# Patient Record
Sex: MALE | Race: WHITE | NOT HISPANIC OR LATINO | URBAN - METROPOLITAN AREA
[De-identification: names, ages, dates, MRNs, and addresses within clinical notes are randomized per-mention and may not be internally consistent; named-entity substitution may affect disease eponyms.]

---

## 2017-03-25 ENCOUNTER — EMERGENCY (EMERGENCY)
Facility: HOSPITAL | Age: 72
LOS: 1 days | Discharge: HOME | End: 2017-03-25
Attending: EMERGENCY MEDICINE
Payer: MEDICARE

## 2017-03-25 VITALS
RESPIRATION RATE: 20 BRPM | SYSTOLIC BLOOD PRESSURE: 161 MMHG | OXYGEN SATURATION: 98 % | HEART RATE: 78 BPM | HEIGHT: 69 IN | DIASTOLIC BLOOD PRESSURE: 79 MMHG | WEIGHT: 227.08 LBS | TEMPERATURE: 98 F

## 2017-03-25 DIAGNOSIS — M54.5 LOW BACK PAIN: ICD-10-CM

## 2017-03-25 LAB
ANION GAP SERPL CALC-SCNC: 13 MMOL/L — SIGNIFICANT CHANGE UP (ref 5–17)
APPEARANCE UR: CLEAR — SIGNIFICANT CHANGE UP
BASOPHILS # BLD AUTO: 0 K/UL — SIGNIFICANT CHANGE UP (ref 0–0.2)
BASOPHILS NFR BLD AUTO: 0.5 % — SIGNIFICANT CHANGE UP (ref 0–2)
BILIRUB UR-MCNC: NEGATIVE — SIGNIFICANT CHANGE UP
BUN SERPL-MCNC: 22 MG/DL — HIGH (ref 8–20)
CALCIUM SERPL-MCNC: 9.6 MG/DL — SIGNIFICANT CHANGE UP (ref 8.6–10.2)
CHLORIDE SERPL-SCNC: 100 MMOL/L — SIGNIFICANT CHANGE UP (ref 98–107)
CO2 SERPL-SCNC: 29 MMOL/L — SIGNIFICANT CHANGE UP (ref 22–29)
COLOR SPEC: YELLOW — SIGNIFICANT CHANGE UP
CREAT SERPL-MCNC: 0.67 MG/DL — SIGNIFICANT CHANGE UP (ref 0.5–1.3)
DIFF PNL FLD: NEGATIVE — SIGNIFICANT CHANGE UP
EOSINOPHIL # BLD AUTO: 0.6 K/UL — HIGH (ref 0–0.5)
EOSINOPHIL NFR BLD AUTO: 6.1 % — HIGH (ref 0–5)
GLUCOSE SERPL-MCNC: 100 MG/DL — SIGNIFICANT CHANGE UP (ref 70–115)
GLUCOSE UR QL: NEGATIVE MG/DL — SIGNIFICANT CHANGE UP
HCT VFR BLD CALC: 39.6 % — LOW (ref 42–52)
HGB BLD-MCNC: 12.7 G/DL — LOW (ref 14–18)
KETONES UR-MCNC: NEGATIVE — SIGNIFICANT CHANGE UP
LEUKOCYTE ESTERASE UR-ACNC: NEGATIVE — SIGNIFICANT CHANGE UP
LYMPHOCYTES # BLD AUTO: 1.8 K/UL — SIGNIFICANT CHANGE UP (ref 1–4.8)
LYMPHOCYTES # BLD AUTO: 17.9 % — LOW (ref 20–55)
MCHC RBC-ENTMCNC: 29.1 PG — SIGNIFICANT CHANGE UP (ref 27–31)
MCHC RBC-ENTMCNC: 32.1 G/DL — SIGNIFICANT CHANGE UP (ref 32–36)
MCV RBC AUTO: 90.6 FL — SIGNIFICANT CHANGE UP (ref 80–94)
MONOCYTES # BLD AUTO: 0.8 K/UL — SIGNIFICANT CHANGE UP (ref 0–0.8)
MONOCYTES NFR BLD AUTO: 8.5 % — SIGNIFICANT CHANGE UP (ref 3–10)
NEUTROPHILS # BLD AUTO: 6.5 K/UL — SIGNIFICANT CHANGE UP (ref 1.8–8)
NEUTROPHILS NFR BLD AUTO: 66.8 % — SIGNIFICANT CHANGE UP (ref 37–73)
NITRITE UR-MCNC: NEGATIVE — SIGNIFICANT CHANGE UP
PH UR: 6.5 — SIGNIFICANT CHANGE UP (ref 4.8–8)
PLATELET # BLD AUTO: 288 K/UL — SIGNIFICANT CHANGE UP (ref 150–400)
POTASSIUM SERPL-MCNC: 4.1 MMOL/L — SIGNIFICANT CHANGE UP (ref 3.5–5.3)
POTASSIUM SERPL-SCNC: 4.1 MMOL/L — SIGNIFICANT CHANGE UP (ref 3.5–5.3)
PROT UR-MCNC: NEGATIVE MG/DL — SIGNIFICANT CHANGE UP
RBC # BLD: 4.37 M/UL — LOW (ref 4.6–6.2)
RBC # FLD: 16.3 % — HIGH (ref 11–15.6)
SODIUM SERPL-SCNC: 142 MMOL/L — SIGNIFICANT CHANGE UP (ref 135–145)
SP GR SPEC: 1.01 — SIGNIFICANT CHANGE UP (ref 1.01–1.02)
UROBILINOGEN FLD QL: NEGATIVE MG/DL — SIGNIFICANT CHANGE UP
WBC # BLD: 9.8 K/UL — SIGNIFICANT CHANGE UP (ref 4.8–10.8)
WBC # FLD AUTO: 9.8 K/UL — SIGNIFICANT CHANGE UP (ref 4.8–10.8)

## 2017-03-25 PROCEDURE — 99284 EMERGENCY DEPT VISIT MOD MDM: CPT | Mod: 25

## 2017-03-25 PROCEDURE — 74176 CT ABD & PELVIS W/O CONTRAST: CPT

## 2017-03-25 PROCEDURE — 72131 CT LUMBAR SPINE W/O DYE: CPT | Mod: 26

## 2017-03-25 PROCEDURE — 81003 URINALYSIS AUTO W/O SCOPE: CPT

## 2017-03-25 PROCEDURE — 80048 BASIC METABOLIC PNL TOTAL CA: CPT

## 2017-03-25 PROCEDURE — 74176 CT ABD & PELVIS W/O CONTRAST: CPT | Mod: 26

## 2017-03-25 PROCEDURE — 99284 EMERGENCY DEPT VISIT MOD MDM: CPT

## 2017-03-25 PROCEDURE — 85027 COMPLETE CBC AUTOMATED: CPT

## 2017-03-25 RX ORDER — IBUPROFEN 200 MG
1 TABLET ORAL
Qty: 28 | Refills: 0 | OUTPATIENT
Start: 2017-03-25 | End: 2017-04-01

## 2017-03-25 RX ORDER — DIAZEPAM 5 MG
5 TABLET ORAL ONCE
Qty: 0 | Refills: 0 | Status: DISCONTINUED | OUTPATIENT
Start: 2017-03-25 | End: 2017-03-25

## 2017-03-25 RX ORDER — METHOCARBAMOL 500 MG/1
2 TABLET, FILM COATED ORAL
Qty: 30 | Refills: 0 | OUTPATIENT
Start: 2017-03-25 | End: 2017-03-30

## 2017-03-25 RX ADMIN — Medication 5 MILLIGRAM(S): at 17:53

## 2017-03-25 NOTE — ED PROVIDER NOTE - PROGRESS NOTE DETAILS
Pt reports moderate relief of presenting symptoms. Pts labs wnl, urine negative for infection and CT scan negative for acute pathology. D/w Dr. Nails-- pt stable for d/c with muscle relaxers and pcp f/u.

## 2017-03-25 NOTE — ED ADULT NURSE NOTE - OBJECTIVE STATEMENT
pt alert and awake x3, arrived to ED c/o left flank pain that started 2 hours ago, denies urinary problems, denies chest pain/sob, LS clear, resp even and unlabored bilat, will continue to monitor.

## 2017-03-25 NOTE — ED PROVIDER NOTE - OBJECTIVE STATEMENT
70 yo wm pmh hypercholesterolemia comes to ed with left lower back pain x 2 hours; pt noted being treated for sinus infection with augmentin; pt denies any recent trauma; pt noted he had a pelvic fracture at the age of 28;

## 2017-04-26 ENCOUNTER — APPOINTMENT (OUTPATIENT)
Dept: DERMATOLOGY | Facility: CLINIC | Age: 72
End: 2017-04-26

## 2017-09-15 ENCOUNTER — OUTPATIENT (OUTPATIENT)
Dept: OUTPATIENT SERVICES | Facility: HOSPITAL | Age: 72
LOS: 1 days | End: 2017-09-15
Payer: MEDICARE

## 2017-09-15 ENCOUNTER — TRANSCRIPTION ENCOUNTER (OUTPATIENT)
Age: 72
End: 2017-09-15

## 2017-09-15 DIAGNOSIS — Z12.11 ENCOUNTER FOR SCREENING FOR MALIGNANT NEOPLASM OF COLON: ICD-10-CM

## 2017-09-15 PROCEDURE — G0105: CPT

## 2017-11-24 ENCOUNTER — EMERGENCY (EMERGENCY)
Facility: HOSPITAL | Age: 72
LOS: 1 days | Discharge: HOME | End: 2017-11-24
Attending: EMERGENCY MEDICINE | Admitting: EMERGENCY MEDICINE
Payer: MEDICARE

## 2017-11-24 VITALS
DIASTOLIC BLOOD PRESSURE: 78 MMHG | TEMPERATURE: 98 F | HEIGHT: 70 IN | OXYGEN SATURATION: 97 % | HEART RATE: 74 BPM | WEIGHT: 229.94 LBS | SYSTOLIC BLOOD PRESSURE: 136 MMHG | RESPIRATION RATE: 20 BRPM

## 2017-11-24 PROCEDURE — 99284 EMERGENCY DEPT VISIT MOD MDM: CPT

## 2017-11-24 PROCEDURE — 74176 CT ABD & PELVIS W/O CONTRAST: CPT | Mod: 26

## 2017-11-24 NOTE — ED PROVIDER NOTE - NS ED ROS FT
no weight change, no fever or chills  no rash, no bruises  no visual changes no discharge  no cough cold or congestion,   no sob, no chest pain  no orthopnea, no pnd  +left flank pain, no abd pain, no n/v/d  +hematuria, no change in urinary habits  no headache, no paresthesia  no previous psych evaluation

## 2017-11-24 NOTE — ED PROVIDER NOTE - PHYSICAL EXAMINATION
Constitutinal :Appears comfortably, talking in full sentences  Head :NC AT , no swelling  Eyes :eomi, no swelling  Mouth :mm moist, poor dentition. Facial asymmetry  Neck : supple, trachea in midline  Chest :Jorden air entry, symm chest expansion, no distress  Heart :S1 S2 distant  Abdomen :abd soft, non tender  : no groin erythema. No ulcers. No erythema to meatus.   Musc/Skel :ext no swelling, no deformity, no spine tenderness, distal pilses present  Neuro  :AAO 3 no focal deficits

## 2017-11-24 NOTE — ED PROVIDER NOTE - OBJECTIVE STATEMENT
73 y/o M pt presents to ED c/o hematuria x1-2 weeks. Pt reports that a few weeks ago, he noticed a small amount of blood in his urine that resolved. Earlier this week he experienced more blood in his urine, which has increased today. He notes left flank pain when he lays on that side. Pt describes hematuria as "a lot of blood comes out at first, and then the urine turns pink." Pt was evaluated in ED 4 months ago for left flank pain and was told he has a small kidney stone. He denies blood thinners. Pt denies recent illness, weight change, CP, SOB, nausea, vomiting, abdominal pain, and headache.

## 2017-11-25 VITALS
DIASTOLIC BLOOD PRESSURE: 96 MMHG | RESPIRATION RATE: 20 BRPM | TEMPERATURE: 97 F | HEART RATE: 81 BPM | OXYGEN SATURATION: 97 % | SYSTOLIC BLOOD PRESSURE: 156 MMHG

## 2017-11-25 LAB
ALBUMIN SERPL ELPH-MCNC: 3.8 G/DL — SIGNIFICANT CHANGE UP (ref 3.3–5.2)
ALP SERPL-CCNC: 122 U/L — HIGH (ref 40–120)
ALT FLD-CCNC: 17 U/L — SIGNIFICANT CHANGE UP
ANION GAP SERPL CALC-SCNC: 12 MMOL/L — SIGNIFICANT CHANGE UP (ref 5–17)
APPEARANCE UR: CLEAR — SIGNIFICANT CHANGE UP
APTT BLD: 32.5 SEC — SIGNIFICANT CHANGE UP (ref 27.5–37.4)
AST SERPL-CCNC: 26 U/L — SIGNIFICANT CHANGE UP
BASOPHILS # BLD AUTO: 0 K/UL — SIGNIFICANT CHANGE UP (ref 0–0.2)
BASOPHILS NFR BLD AUTO: 0.3 % — SIGNIFICANT CHANGE UP (ref 0–2)
BILIRUB SERPL-MCNC: 0.2 MG/DL — LOW (ref 0.4–2)
BILIRUB UR-MCNC: NEGATIVE — SIGNIFICANT CHANGE UP
BUN SERPL-MCNC: 27 MG/DL — HIGH (ref 8–20)
CALCIUM SERPL-MCNC: 9.2 MG/DL — SIGNIFICANT CHANGE UP (ref 8.6–10.2)
CHLORIDE SERPL-SCNC: 103 MMOL/L — SIGNIFICANT CHANGE UP (ref 98–107)
CO2 SERPL-SCNC: 28 MMOL/L — SIGNIFICANT CHANGE UP (ref 22–29)
COLOR SPEC: YELLOW — SIGNIFICANT CHANGE UP
CREAT SERPL-MCNC: 0.69 MG/DL — SIGNIFICANT CHANGE UP (ref 0.5–1.3)
DIFF PNL FLD: ABNORMAL
EOSINOPHIL # BLD AUTO: 0.5 K/UL — SIGNIFICANT CHANGE UP (ref 0–0.5)
EOSINOPHIL NFR BLD AUTO: 7.2 % — HIGH (ref 0–5)
EPI CELLS # UR: SIGNIFICANT CHANGE UP
GLUCOSE SERPL-MCNC: 127 MG/DL — HIGH (ref 70–115)
GLUCOSE UR QL: NEGATIVE MG/DL — SIGNIFICANT CHANGE UP
HCT VFR BLD CALC: 37.4 % — LOW (ref 42–52)
HGB BLD-MCNC: 11.9 G/DL — LOW (ref 14–18)
INR BLD: 1.01 RATIO — SIGNIFICANT CHANGE UP (ref 0.88–1.16)
KETONES UR-MCNC: NEGATIVE — SIGNIFICANT CHANGE UP
LACTATE BLDV-MCNC: 0.8 MMOL/L — SIGNIFICANT CHANGE UP (ref 0.5–2)
LEUKOCYTE ESTERASE UR-ACNC: ABNORMAL
LYMPHOCYTES # BLD AUTO: 2 K/UL — SIGNIFICANT CHANGE UP (ref 1–4.8)
LYMPHOCYTES # BLD AUTO: 31.9 % — SIGNIFICANT CHANGE UP (ref 20–55)
MCHC RBC-ENTMCNC: 29.2 PG — SIGNIFICANT CHANGE UP (ref 27–31)
MCHC RBC-ENTMCNC: 31.8 G/DL — LOW (ref 32–36)
MCV RBC AUTO: 91.9 FL — SIGNIFICANT CHANGE UP (ref 80–94)
MONOCYTES # BLD AUTO: 0.8 K/UL — SIGNIFICANT CHANGE UP (ref 0–0.8)
MONOCYTES NFR BLD AUTO: 11.7 % — HIGH (ref 3–10)
NEUTROPHILS # BLD AUTO: 3.1 K/UL — SIGNIFICANT CHANGE UP (ref 1.8–8)
NEUTROPHILS NFR BLD AUTO: 48.7 % — SIGNIFICANT CHANGE UP (ref 37–73)
NITRITE UR-MCNC: NEGATIVE — SIGNIFICANT CHANGE UP
PH UR: 6 — SIGNIFICANT CHANGE UP (ref 5–8)
PLATELET # BLD AUTO: 303 K/UL — SIGNIFICANT CHANGE UP (ref 150–400)
POTASSIUM SERPL-MCNC: 4.3 MMOL/L — SIGNIFICANT CHANGE UP (ref 3.5–5.3)
POTASSIUM SERPL-SCNC: 4.3 MMOL/L — SIGNIFICANT CHANGE UP (ref 3.5–5.3)
PROT SERPL-MCNC: 6.9 G/DL — SIGNIFICANT CHANGE UP (ref 6.6–8.7)
PROT UR-MCNC: NEGATIVE MG/DL — SIGNIFICANT CHANGE UP
PROTHROM AB SERPL-ACNC: 11.1 SEC — SIGNIFICANT CHANGE UP (ref 9.8–12.7)
RBC # BLD: 4.07 M/UL — LOW (ref 4.6–6.2)
RBC # FLD: 16 % — HIGH (ref 11–15.6)
RBC CASTS # UR COMP ASSIST: SIGNIFICANT CHANGE UP /HPF (ref 0–4)
SODIUM SERPL-SCNC: 143 MMOL/L — SIGNIFICANT CHANGE UP (ref 135–145)
SP GR SPEC: 1.02 — SIGNIFICANT CHANGE UP (ref 1.01–1.02)
UROBILINOGEN FLD QL: NEGATIVE MG/DL — SIGNIFICANT CHANGE UP
WBC # BLD: 6.4 K/UL — SIGNIFICANT CHANGE UP (ref 4.8–10.8)
WBC # FLD AUTO: 6.4 K/UL — SIGNIFICANT CHANGE UP (ref 4.8–10.8)
WBC UR QL: SIGNIFICANT CHANGE UP

## 2017-11-25 PROCEDURE — 36415 COLL VENOUS BLD VENIPUNCTURE: CPT

## 2017-11-25 PROCEDURE — 99284 EMERGENCY DEPT VISIT MOD MDM: CPT | Mod: 25

## 2017-11-25 PROCEDURE — 74176 CT ABD & PELVIS W/O CONTRAST: CPT

## 2017-11-25 PROCEDURE — 85027 COMPLETE CBC AUTOMATED: CPT

## 2017-11-25 PROCEDURE — 80053 COMPREHEN METABOLIC PANEL: CPT

## 2017-11-25 PROCEDURE — 85730 THROMBOPLASTIN TIME PARTIAL: CPT

## 2017-11-25 PROCEDURE — 81001 URINALYSIS AUTO W/SCOPE: CPT

## 2017-11-25 PROCEDURE — 85610 PROTHROMBIN TIME: CPT

## 2017-11-25 PROCEDURE — 87086 URINE CULTURE/COLONY COUNT: CPT

## 2017-11-25 PROCEDURE — 83605 ASSAY OF LACTIC ACID: CPT

## 2017-11-25 RX ORDER — SODIUM CHLORIDE 9 MG/ML
999 INJECTION INTRAMUSCULAR; INTRAVENOUS; SUBCUTANEOUS ONCE
Qty: 0 | Refills: 0 | Status: COMPLETED | OUTPATIENT
Start: 2017-11-25 | End: 2017-11-25

## 2017-11-25 RX ADMIN — SODIUM CHLORIDE 999 MILLILITER(S): 9 INJECTION INTRAMUSCULAR; INTRAVENOUS; SUBCUTANEOUS at 01:58

## 2017-11-25 NOTE — ED ADULT NURSE NOTE - OBJECTIVE STATEMENT
Pt A&Ox4 c/o hematuria coming and go x 1 week at this time. Pt resting comfortably, VSS, no signs of distress at this time, safety maintained, call bell in reach.

## 2017-11-25 NOTE — ED ADULT NURSE REASSESSMENT NOTE - NS ED NURSE REASSESS COMMENT FT1
Pt A&Ox4 offers no complaint at this time. Pt resting comfortably, VSS, no signs of distress at this time, monitoring BP giving fluids, safety maintained, call bell in reach.

## 2017-11-25 NOTE — ED POST DISCHARGE NOTE - RESULT SUMMARY
NP Rotjan: + strep uti - spoke with pt, has apt with Dr. Mai (urology) tomorrow- requested to fax results to his office

## 2017-11-27 LAB
CULTURE RESULTS: SIGNIFICANT CHANGE UP
SPECIMEN SOURCE: SIGNIFICANT CHANGE UP

## 2017-11-29 NOTE — PROVIDER CONTACT NOTE (OTHER) - ASSESSMENT
PT ARRIVED TO ER, STATES HE RECEIVED A CALL TO EITHER COME  MEDICATION OR  A PRESCRIPTION.  PT WAS SEEN BY UROLOGY DR MARKHAM TODAY BUT NO MEDS WERE GIVEN AS PT STATED HE WAS GOING TO THE ER TO GET HIS MEDS.  NO NOTES IS CHART, NM NOT AWARE OF ANYTHING AS WELL.  CM CALLED PT'S PHARMACY, NO SCRIPTS CALLED IN.  CM REACHED OUT TO UROLOGY DR MARKHAM TO INFORM HIM OF SAME.  LEFT MESSAGE WITH DR MARKHAM'S NURSE TO ADDRESS THE NEED FOR ANY UTI MEDS NEEDED.

## 2017-12-09 ENCOUNTER — EMERGENCY (EMERGENCY)
Facility: HOSPITAL | Age: 72
LOS: 1 days | Discharge: HOME | End: 2017-12-09
Attending: EMERGENCY MEDICINE | Admitting: EMERGENCY MEDICINE
Payer: MEDICARE

## 2017-12-09 VITALS
WEIGHT: 229.94 LBS | DIASTOLIC BLOOD PRESSURE: 78 MMHG | HEART RATE: 92 BPM | RESPIRATION RATE: 18 BRPM | HEIGHT: 71 IN | SYSTOLIC BLOOD PRESSURE: 136 MMHG | TEMPERATURE: 97 F | OXYGEN SATURATION: 98 %

## 2017-12-09 LAB
APPEARANCE UR: CLEAR — SIGNIFICANT CHANGE UP
BACTERIA # UR AUTO: ABNORMAL
BILIRUB UR-MCNC: NEGATIVE — SIGNIFICANT CHANGE UP
COLOR SPEC: YELLOW — SIGNIFICANT CHANGE UP
DIFF PNL FLD: NEGATIVE — SIGNIFICANT CHANGE UP
EPI CELLS # UR: NEGATIVE — SIGNIFICANT CHANGE UP
GLUCOSE UR QL: NEGATIVE MG/DL — SIGNIFICANT CHANGE UP
KETONES UR-MCNC: ABNORMAL
LEUKOCYTE ESTERASE UR-ACNC: ABNORMAL
NITRITE UR-MCNC: NEGATIVE — SIGNIFICANT CHANGE UP
PH UR: 5 — SIGNIFICANT CHANGE UP (ref 5–8)
PROT UR-MCNC: 30 MG/DL
RBC CASTS # UR COMP ASSIST: SIGNIFICANT CHANGE UP /HPF (ref 0–4)
SP GR SPEC: 1.02 — SIGNIFICANT CHANGE UP (ref 1.01–1.02)
UROBILINOGEN FLD QL: NEGATIVE MG/DL — SIGNIFICANT CHANGE UP
WBC UR QL: SIGNIFICANT CHANGE UP

## 2017-12-09 PROCEDURE — 99283 EMERGENCY DEPT VISIT LOW MDM: CPT | Mod: 25

## 2017-12-09 PROCEDURE — 73070 X-RAY EXAM OF ELBOW: CPT

## 2017-12-09 PROCEDURE — 99283 EMERGENCY DEPT VISIT LOW MDM: CPT

## 2017-12-09 PROCEDURE — 81001 URINALYSIS AUTO W/SCOPE: CPT

## 2017-12-09 PROCEDURE — 73070 X-RAY EXAM OF ELBOW: CPT | Mod: 26,RT

## 2017-12-09 RX ORDER — ROSUVASTATIN CALCIUM 5 MG/1
0 TABLET ORAL
Qty: 0 | Refills: 0 | COMMUNITY

## 2017-12-09 NOTE — ED PROVIDER NOTE - OBJECTIVE STATEMENT
73 y/o M c/o right elbow pain radiating to shoulder x 3 days.  Denies injury.  Patient states that he has history of arthritis.  he states that he was doing mechanical work on his car prior to the onset of pain.  Patient states that received a call back saying that he had blood in his urine.  Denies any dysuria, fever, or back pain.

## 2017-12-09 NOTE — ED ADULT NURSE NOTE - OBJECTIVE STATEMENT
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 as per pt, mild swelling noted. no loss of sensation noted, pt states he was seen in ED one week ago for hematuria, pt dx with kidney infection. pt states he never received the abx that was prescribed, pt states the RX was not sent over to the pharmacy. afebrile. a&ox3

## 2017-12-09 NOTE — ED PROVIDER NOTE - ATTENDING CONTRIBUTION TO CARE
I, Isaura Hills, independently saw and evaluated the patient. SURESH Bonilla made the initial evaluation and discussed history, physical and plan with me and I agree. I examined the patient and shared XR results with patient. I offered emotional support as patient tells me that his girlfriend just recently passed away from Parkinson's disease. I discussed indications to return to the ED and the importance of proper follow up and he verbalizes understanding and agrees with plan.

## 2018-03-30 ENCOUNTER — APPOINTMENT (OUTPATIENT)
Dept: ORTHOPEDIC SURGERY | Facility: CLINIC | Age: 73
End: 2018-03-30
Payer: MEDICARE

## 2018-03-30 VITALS
SYSTOLIC BLOOD PRESSURE: 157 MMHG | WEIGHT: 230 LBS | HEART RATE: 71 BPM | HEIGHT: 70 IN | BODY MASS INDEX: 32.93 KG/M2 | DIASTOLIC BLOOD PRESSURE: 89 MMHG

## 2018-03-30 DIAGNOSIS — M17.10 UNILATERAL PRIMARY OSTEOARTHRITIS, UNSPECIFIED KNEE: ICD-10-CM

## 2018-03-30 DIAGNOSIS — Z85.828 PERSONAL HISTORY OF OTHER MALIGNANT NEOPLASM OF SKIN: ICD-10-CM

## 2018-03-30 DIAGNOSIS — Z80.9 FAMILY HISTORY OF MALIGNANT NEOPLASM, UNSPECIFIED: ICD-10-CM

## 2018-03-30 DIAGNOSIS — Z78.9 OTHER SPECIFIED HEALTH STATUS: ICD-10-CM

## 2018-03-30 DIAGNOSIS — Z86.39 PERSONAL HISTORY OF OTHER ENDOCRINE, NUTRITIONAL AND METABOLIC DISEASE: ICD-10-CM

## 2018-03-30 PROCEDURE — 73562 X-RAY EXAM OF KNEE 3: CPT | Mod: RT

## 2018-03-30 PROCEDURE — 99204 OFFICE O/P NEW MOD 45 MIN: CPT

## 2018-03-30 RX ORDER — CIPROFLOXACIN HYDROCHLORIDE 500 MG/1
500 TABLET, FILM COATED ORAL
Qty: 6 | Refills: 0 | Status: ACTIVE | COMMUNITY
Start: 2017-12-27

## 2018-03-30 RX ORDER — FINASTERIDE 5 MG/1
5 TABLET, FILM COATED ORAL
Qty: 90 | Refills: 0 | Status: ACTIVE | COMMUNITY
Start: 2017-12-27

## 2018-03-30 RX ORDER — SULFAMETHOXAZOLE AND TRIMETHOPRIM 800; 160 MG/1; MG/1
800-160 TABLET ORAL
Qty: 14 | Refills: 0 | Status: ACTIVE | COMMUNITY
Start: 2018-01-08

## 2018-03-30 RX ORDER — METHYLPREDNISOLONE 4 MG/1
4 TABLET ORAL
Qty: 21 | Refills: 0 | Status: ACTIVE | COMMUNITY
Start: 2017-12-09

## 2018-03-30 RX ORDER — DICLOFENAC SODIUM 50 MG/1
50 TABLET, DELAYED RELEASE ORAL
Qty: 30 | Refills: 0 | Status: ACTIVE | COMMUNITY
Start: 2017-10-20

## 2018-03-30 RX ORDER — ATORVASTATIN CALCIUM 20 MG/1
20 TABLET, FILM COATED ORAL
Qty: 30 | Refills: 0 | Status: ACTIVE | COMMUNITY
Start: 2017-10-13

## 2018-03-30 RX ORDER — TAMSULOSIN HYDROCHLORIDE 0.4 MG/1
0.4 CAPSULE ORAL
Qty: 90 | Refills: 0 | Status: ACTIVE | COMMUNITY
Start: 2017-10-12

## 2018-03-30 RX ORDER — LISINOPRIL AND HYDROCHLOROTHIAZIDE TABLETS 20; 12.5 MG/1; MG/1
20-12.5 TABLET ORAL
Qty: 90 | Refills: 0 | Status: ACTIVE | COMMUNITY
Start: 2017-11-07

## 2018-04-25 ENCOUNTER — APPOINTMENT (OUTPATIENT)
Dept: DERMATOLOGY | Facility: CLINIC | Age: 73
End: 2018-04-25

## 2018-05-03 ENCOUNTER — APPOINTMENT (OUTPATIENT)
Dept: DERMATOLOGY | Facility: CLINIC | Age: 73
End: 2018-05-03
Payer: MEDICARE

## 2018-05-03 PROCEDURE — 17004 DESTROY PREMAL LESIONS 15/>: CPT

## 2018-05-03 PROCEDURE — 99214 OFFICE O/P EST MOD 30 MIN: CPT | Mod: 25

## 2019-07-19 ENCOUNTER — OUTPATIENT (OUTPATIENT)
Dept: OUTPATIENT SERVICES | Facility: HOSPITAL | Age: 74
LOS: 1 days | End: 2019-07-19
Payer: MEDICARE

## 2019-07-19 PROCEDURE — 93010 ELECTROCARDIOGRAM REPORT: CPT

## 2019-08-08 NOTE — ED ADULT NURSE NOTE - PSYCHOSOCIAL WDL
Alert and oriented x 3, normal mood and affect, no apparent risk to self or others. Need for Mobility Assisted Device

## 2019-08-09 ENCOUNTER — INPATIENT (INPATIENT)
Facility: HOSPITAL | Age: 74
LOS: 1 days | Discharge: NURSING, REHAB OR LONG-TERM CARE | End: 2019-08-11
Payer: MEDICARE

## 2019-08-09 PROCEDURE — 73560 X-RAY EXAM OF KNEE 1 OR 2: CPT | Mod: 26,RT

## 2019-08-10 ENCOUNTER — OUTPATIENT (OUTPATIENT)
Dept: OUTPATIENT SERVICES | Facility: HOSPITAL | Age: 74
LOS: 1 days | End: 2019-08-10

## 2019-08-11 ENCOUNTER — OUTPATIENT (OUTPATIENT)
Dept: OUTPATIENT SERVICES | Facility: HOSPITAL | Age: 74
LOS: 1 days | End: 2019-08-11

## 2019-08-11 ENCOUNTER — INPATIENT (INPATIENT)
Facility: HOSPITAL | Age: 74
LOS: 12 days | Discharge: HOME | End: 2019-08-24
Attending: INTERNAL MEDICINE | Admitting: INTERNAL MEDICINE
Payer: MEDICARE

## 2019-08-12 ENCOUNTER — OUTPATIENT (OUTPATIENT)
Dept: OUTPATIENT SERVICES | Facility: HOSPITAL | Age: 74
LOS: 1 days | End: 2019-08-12

## 2019-08-13 ENCOUNTER — OUTPATIENT (OUTPATIENT)
Dept: OUTPATIENT SERVICES | Facility: HOSPITAL | Age: 74
LOS: 1 days | End: 2019-08-13

## 2019-08-14 ENCOUNTER — OUTPATIENT (OUTPATIENT)
Dept: OUTPATIENT SERVICES | Facility: HOSPITAL | Age: 74
LOS: 1 days | End: 2019-08-14

## 2019-08-14 PROCEDURE — 93970 EXTREMITY STUDY: CPT | Mod: 26

## 2019-08-15 ENCOUNTER — OUTPATIENT (OUTPATIENT)
Dept: OUTPATIENT SERVICES | Facility: HOSPITAL | Age: 74
LOS: 1 days | End: 2019-08-15

## 2019-08-19 ENCOUNTER — OUTPATIENT (OUTPATIENT)
Dept: OUTPATIENT SERVICES | Facility: HOSPITAL | Age: 74
LOS: 1 days | End: 2019-08-19

## 2019-08-23 ENCOUNTER — OUTPATIENT (OUTPATIENT)
Dept: OUTPATIENT SERVICES | Facility: HOSPITAL | Age: 74
LOS: 1 days | End: 2019-08-23

## 2019-10-11 NOTE — ED PROVIDER NOTE - ENMT NEGATIVE STATEMENT, MLM
To get better and follow your care plan as instructed.
Ears: no ear pain and no hearing problems.Nose: no nasal congestion and no nasal drainage.Mouth/Throat: no dysphagia, no hoarseness and no throat pain.Neck: no lumps, no pain, no stiffness and no swollen glands.

## 2020-01-02 ENCOUNTER — OUTPATIENT (OUTPATIENT)
Dept: OUTPATIENT SERVICES | Facility: HOSPITAL | Age: 75
LOS: 1 days | End: 2020-01-02

## 2020-01-17 ENCOUNTER — INPATIENT (INPATIENT)
Facility: HOSPITAL | Age: 75
LOS: 2 days | Discharge: NURSING, REHAB OR LONG-TERM CARE | End: 2020-01-20
Payer: MEDICARE

## 2020-01-17 ENCOUNTER — OUTPATIENT (OUTPATIENT)
Dept: OUTPATIENT SERVICES | Facility: HOSPITAL | Age: 75
LOS: 1 days | End: 2020-01-17

## 2020-01-17 PROCEDURE — 73560 X-RAY EXAM OF KNEE 1 OR 2: CPT | Mod: 26,LT

## 2020-01-18 ENCOUNTER — OUTPATIENT (OUTPATIENT)
Dept: OUTPATIENT SERVICES | Facility: HOSPITAL | Age: 75
LOS: 1 days | End: 2020-01-18

## 2020-01-19 ENCOUNTER — OUTPATIENT (OUTPATIENT)
Dept: OUTPATIENT SERVICES | Facility: HOSPITAL | Age: 75
LOS: 1 days | End: 2020-01-19

## 2020-01-20 ENCOUNTER — OUTPATIENT (OUTPATIENT)
Dept: OUTPATIENT SERVICES | Facility: HOSPITAL | Age: 75
LOS: 1 days | End: 2020-01-20

## 2020-01-20 ENCOUNTER — INPATIENT (INPATIENT)
Facility: HOSPITAL | Age: 75
LOS: 9 days | Discharge: HOME | End: 2020-01-30
Attending: INTERNAL MEDICINE | Admitting: INTERNAL MEDICINE

## 2020-01-21 ENCOUNTER — OUTPATIENT (OUTPATIENT)
Dept: OUTPATIENT SERVICES | Facility: HOSPITAL | Age: 75
LOS: 1 days | End: 2020-01-21

## 2020-01-24 ENCOUNTER — OUTPATIENT (OUTPATIENT)
Dept: OUTPATIENT SERVICES | Facility: HOSPITAL | Age: 75
LOS: 1 days | End: 2020-01-24

## 2020-01-30 ENCOUNTER — OUTPATIENT (OUTPATIENT)
Dept: OUTPATIENT SERVICES | Facility: HOSPITAL | Age: 75
LOS: 1 days | End: 2020-01-30

## 2020-03-13 PROBLEM — E78.5 HYPERLIPIDEMIA, UNSPECIFIED: Chronic | Status: ACTIVE | Noted: 2017-12-09

## 2020-03-14 ENCOUNTER — APPOINTMENT (OUTPATIENT)
Dept: DERMATOLOGY | Facility: CLINIC | Age: 75
End: 2020-03-14
Payer: MEDICARE

## 2020-03-14 PROCEDURE — 17000 DESTRUCT PREMALG LESION: CPT

## 2020-03-14 PROCEDURE — 99213 OFFICE O/P EST LOW 20 MIN: CPT | Mod: 25

## 2020-03-14 PROCEDURE — 17003 DESTRUCT PREMALG LES 2-14: CPT

## 2020-05-04 NOTE — ED POST DISCHARGE NOTE - CERTIFIED LETTER SENT
No
Painful - The patient does not respond to voice, but does respond to a painful stimulus, such as a squeeze to the hand or sternal rub. A noxious stimulous is needed to elicit a response.

## 2021-03-15 ENCOUNTER — APPOINTMENT (OUTPATIENT)
Dept: DERMATOLOGY | Facility: CLINIC | Age: 76
End: 2021-03-15
Payer: MEDICARE

## 2021-03-15 PROCEDURE — 17000 DESTRUCT PREMALG LESION: CPT

## 2021-03-15 PROCEDURE — 99213 OFFICE O/P EST LOW 20 MIN: CPT | Mod: 25

## 2021-03-15 PROCEDURE — 17003 DESTRUCT PREMALG LES 2-14: CPT

## 2021-10-11 NOTE — ED ADULT NURSE NOTE - ALCOHOL PRE SCREEN (AUDIT - C)
From: Sandy Gonsalez  To: Juanis Pizarro  Sent: 10/10/2021 3:50 PM CDT  Subject: Migraine Meds    Since you gave me the medicine for my migraines. I have 4 left. I've also setup those appointments with the doctors we discussed from my last appointment. The sleep medication has helped and I'm not groggy when I wake up.   
Statement Selected

## 2022-03-16 ENCOUNTER — RESULT REVIEW (OUTPATIENT)
Age: 77
End: 2022-03-16

## 2022-03-16 ENCOUNTER — APPOINTMENT (OUTPATIENT)
Dept: DERMATOLOGY | Facility: CLINIC | Age: 77
End: 2022-03-16
Payer: MEDICARE

## 2022-03-16 PROCEDURE — 17000 DESTRUCT PREMALG LESION: CPT | Mod: 59

## 2022-03-16 PROCEDURE — 99214 OFFICE O/P EST MOD 30 MIN: CPT | Mod: 25

## 2022-03-16 PROCEDURE — 17003 DESTRUCT PREMALG LES 2-14: CPT

## 2022-03-16 PROCEDURE — 17282 DSTR MAL LS F/E/E/N/L/M1.1-2: CPT

## 2022-09-21 ENCOUNTER — APPOINTMENT (OUTPATIENT)
Dept: DERMATOLOGY | Facility: CLINIC | Age: 77
End: 2022-09-21

## 2022-10-17 NOTE — ED PROVIDER NOTE - DURATION
[FreeTextEntry1] : Patient is a 28-year-old man with history of type 1 diabetes, history establish endocrinology visit\par \par 1.  Type 1 diabetes, relatively well controlled\par \par Might need to change carb ratio to 1 units per 9 to 10 g when he is on the GLP-1 receptor agonist.\par Discussed risks of the thyroid C-cell tumor, pancreatitis, hypoglycemia, hypersensitivity reactions, acute kidney injury, severe gastrointestinal complications, discussed that the most common adverse infections included nausea, diarrhea, vomiting, and abdominal pain.  We discussed that gastric emptying slow by GLP-1 receptor agonist.  \par Diabetic retinoapthy complications have been reported in a cardiovascular outcomes trial. Should have semi-annual eye exam and close follow up with his/her ophthalmologist.\par Zofran sent for intermittent nausea in the setting of hyperglycemia.\par Up to date, he goes yearly, last seen in July 2022. [facility]\par No podiatrist, he checks his feet annually. \par \par 2.  Thyroid screening\par Normal TFT in June 2022.  \par \par 3.  Celiac screening\par Normal in 2022.\par Repeat next year.\par \par FU CDE in 6 months. \par FU with me in 1 year. \par 
week(s)

## 2023-01-23 ENCOUNTER — OFFICE (OUTPATIENT)
Dept: URBAN - METROPOLITAN AREA CLINIC 116 | Facility: CLINIC | Age: 78
Setting detail: OPHTHALMOLOGY
End: 2023-01-23
Payer: MEDICARE

## 2023-01-23 DIAGNOSIS — H25.13: ICD-10-CM

## 2023-01-23 DIAGNOSIS — H40.003: ICD-10-CM

## 2023-01-23 DIAGNOSIS — H01.001: ICD-10-CM

## 2023-01-23 PROCEDURE — 92250 FUNDUS PHOTOGRAPHY W/I&R: CPT | Performed by: OPTOMETRIST

## 2023-01-23 PROCEDURE — 92004 COMPRE OPH EXAM NEW PT 1/>: CPT | Performed by: OPTOMETRIST

## 2023-01-23 ASSESSMENT — REFRACTION_MANIFEST
OS_ADD: +2.50
OU_VA: 20/25-1
OS_SPHERE: PLANO
OS_CYLINDER: -0.50
OD_ADD: +2.50
OD_CYLINDER: -1.50
OS_AXIS: 105
OD_VA1: 20/25-2
OS_VA1: 20/30-2
OD_SPHERE: -0.75
OD_AXIS: 095

## 2023-01-23 ASSESSMENT — REFRACTION_CURRENTRX
OD_SPHERE: +2.00
OD_OVR_VA: 20/
OS_OVR_VA: 20/
OS_SPHERE: +2.00

## 2023-01-23 ASSESSMENT — SPHEQUIV_DERIVED
OD_SPHEQUIV: -1.625
OD_SPHEQUIV: -1.5
OS_SPHEQUIV: 0.125

## 2023-01-23 ASSESSMENT — REFRACTION_AUTOREFRACTION
OD_AXIS: 095
OS_AXIS: 105
OS_SPHERE: +0.50
OD_SPHERE: -0.75
OS_CYLINDER: -0.75
OD_CYLINDER: -1.75

## 2023-01-23 ASSESSMENT — VISUAL ACUITY
OD_BCVA: 20/30-2
OS_BCVA: 20/50-

## 2023-01-23 ASSESSMENT — CONFRONTATIONAL VISUAL FIELD TEST (CVF)
OS_FINDINGS: FULL
OD_FINDINGS: FULL

## 2023-01-23 ASSESSMENT — KERATOMETRY
OS_K1POWER_DIOPTERS: 42.25
OD_K2POWER_DIOPTERS: 42.75
OD_AXISANGLE_DEGREES: 095
OS_AXISANGLE_DEGREES: 095
OS_K2POWER_DIOPTERS: 42.75
OD_K1POWER_DIOPTERS: 42.50

## 2023-01-23 ASSESSMENT — AXIALLENGTH_DERIVED
OD_AL: 24.5867
OD_AL: 24.5339
OS_AL: 23.9149

## 2023-01-23 ASSESSMENT — LID EXAM ASSESSMENTS: OD_ANGULAR_BLEPHARITIS: RUL 1+

## 2023-01-30 ENCOUNTER — OFFICE (OUTPATIENT)
Dept: URBAN - METROPOLITAN AREA CLINIC 116 | Facility: CLINIC | Age: 78
Setting detail: OPHTHALMOLOGY
End: 2023-01-30
Payer: MEDICARE

## 2023-01-30 DIAGNOSIS — H40.1131: ICD-10-CM

## 2023-01-30 PROCEDURE — 99212 OFFICE O/P EST SF 10 MIN: CPT | Performed by: OPTOMETRIST

## 2023-01-30 PROCEDURE — 76514 ECHO EXAM OF EYE THICKNESS: CPT | Performed by: OPTOMETRIST

## 2023-01-30 PROCEDURE — 92133 CPTRZD OPH DX IMG PST SGM ON: CPT | Performed by: OPTOMETRIST

## 2023-01-30 ASSESSMENT — REFRACTION_AUTOREFRACTION
OS_SPHERE: +0.50
OD_CYLINDER: -1.50
OD_AXIS: 091
OS_CYLINDER: -0.50
OD_SPHERE: -1.00
OS_AXIS: 111

## 2023-01-30 ASSESSMENT — KERATOMETRY
OD_K1POWER_DIOPTERS: 42.50
OS_AXISANGLE_DEGREES: 093
OD_K2POWER_DIOPTERS: 42.75
OD_AXISANGLE_DEGREES: 079
OS_K1POWER_DIOPTERS: 42.50
OS_K2POWER_DIOPTERS: 43.00

## 2023-01-30 ASSESSMENT — AXIALLENGTH_DERIVED
OD_AL: 24.5339
OS_AL: 23.7713
OD_AL: 24.6397

## 2023-01-30 ASSESSMENT — VISUAL ACUITY
OD_BCVA: 20/30-2
OS_BCVA: 20/50-

## 2023-01-30 ASSESSMENT — REFRACTION_MANIFEST
OD_ADD: +2.50
OD_SPHERE: -0.75
OS_VA1: 20/30-2
OS_CYLINDER: -0.50
OD_AXIS: 095
OS_SPHERE: PLANO
OD_CYLINDER: -1.50
OS_ADD: +2.50
OD_VA1: 20/25-2
OU_VA: 20/25-1
OS_AXIS: 105

## 2023-01-30 ASSESSMENT — REFRACTION_CURRENTRX
OD_SPHERE: +2.00
OD_OVR_VA: 20/
OS_SPHERE: +2.00
OS_OVR_VA: 20/

## 2023-01-30 ASSESSMENT — LID EXAM ASSESSMENTS: OD_ANGULAR_BLEPHARITIS: RUL 1+

## 2023-01-30 ASSESSMENT — SPHEQUIV_DERIVED
OD_SPHEQUIV: -1.5
OD_SPHEQUIV: -1.75
OS_SPHEQUIV: 0.25

## 2023-01-30 ASSESSMENT — CONFRONTATIONAL VISUAL FIELD TEST (CVF)
OD_FINDINGS: FULL
OS_FINDINGS: FULL

## 2023-02-01 ENCOUNTER — OFFICE (OUTPATIENT)
Dept: URBAN - METROPOLITAN AREA CLINIC 94 | Facility: CLINIC | Age: 78
Setting detail: OPHTHALMOLOGY
End: 2023-02-01
Payer: MEDICARE

## 2023-02-01 DIAGNOSIS — H25.13: ICD-10-CM

## 2023-02-01 DIAGNOSIS — H40.1131: ICD-10-CM

## 2023-02-01 DIAGNOSIS — H01.001: ICD-10-CM

## 2023-02-01 PROCEDURE — 92083 EXTENDED VISUAL FIELD XM: CPT | Performed by: OPHTHALMOLOGY

## 2023-02-01 PROCEDURE — 99214 OFFICE O/P EST MOD 30 MIN: CPT | Performed by: OPHTHALMOLOGY

## 2023-02-01 PROCEDURE — 92020 GONIOSCOPY: CPT | Performed by: OPHTHALMOLOGY

## 2023-02-01 ASSESSMENT — REFRACTION_AUTOREFRACTION
OS_CYLINDER: -0.50
OD_SPHERE: -1.00
OD_CYLINDER: -1.50
OS_SPHERE: +0.25
OS_AXIS: 109
OD_AXIS: 095

## 2023-02-01 ASSESSMENT — KERATOMETRY
OS_K2POWER_DIOPTERS: 42.75
OS_K1POWER_DIOPTERS: 42.25
OD_K1POWER_DIOPTERS: 42.50
OD_AXISANGLE_DEGREES: 059
OD_K2POWER_DIOPTERS: 43.00
OS_AXISANGLE_DEGREES: 094

## 2023-02-01 ASSESSMENT — AXIALLENGTH_DERIVED
OD_AL: 24.5897
OD_AL: 24.4843
OS_AL: 23.965

## 2023-02-01 ASSESSMENT — REFRACTION_CURRENTRX
OD_OVR_VA: 20/
OS_SPHERE: +2.00
OS_OVR_VA: 20/
OD_SPHERE: +2.00

## 2023-02-01 ASSESSMENT — REFRACTION_MANIFEST
OS_VA1: 20/30-2
OS_AXIS: 105
OU_VA: 20/25-1
OS_CYLINDER: -0.50
OD_VA1: 20/25-2
OD_AXIS: 095
OS_ADD: +2.50
OD_SPHERE: -0.75
OD_CYLINDER: -1.50
OD_ADD: +2.50
OS_SPHERE: PLANO

## 2023-02-01 ASSESSMENT — SPHEQUIV_DERIVED
OD_SPHEQUIV: -1.5
OS_SPHEQUIV: 0
OD_SPHEQUIV: -1.75

## 2023-02-01 ASSESSMENT — CONFRONTATIONAL VISUAL FIELD TEST (CVF)
OS_FINDINGS: FULL
OD_FINDINGS: FULL

## 2023-02-01 ASSESSMENT — VISUAL ACUITY
OD_BCVA: 20/25-1
OS_BCVA: 20/60

## 2023-02-01 ASSESSMENT — LID EXAM ASSESSMENTS: OD_ANGULAR_BLEPHARITIS: RUL 1+

## 2023-03-06 ENCOUNTER — OFFICE (OUTPATIENT)
Dept: URBAN - METROPOLITAN AREA CLINIC 94 | Facility: CLINIC | Age: 78
Setting detail: OPHTHALMOLOGY
End: 2023-03-06
Payer: MEDICARE

## 2023-03-06 DIAGNOSIS — Z20.822: ICD-10-CM

## 2023-03-06 DIAGNOSIS — Z01.812: ICD-10-CM

## 2023-03-06 PROCEDURE — 99211 OFF/OP EST MAY X REQ PHY/QHP: CPT | Performed by: OPHTHALMOLOGY

## 2023-03-06 ASSESSMENT — REFRACTION_MANIFEST
OD_VA1: 20/25-2
OS_CYLINDER: -0.50
OD_CYLINDER: -1.50
OD_ADD: +2.50
OS_AXIS: 105
OD_SPHERE: -0.75
OD_AXIS: 095
OS_ADD: +2.50
OS_VA1: 20/30-2
OS_SPHERE: PLANO
OU_VA: 20/25-1

## 2023-03-06 ASSESSMENT — REFRACTION_AUTOREFRACTION
OS_SPHERE: +0.25
OD_SPHERE: -1.00
OS_AXIS: 109
OD_AXIS: 095
OD_CYLINDER: -1.50
OS_CYLINDER: -0.50

## 2023-03-06 ASSESSMENT — SPHEQUIV_DERIVED
OD_SPHEQUIV: -1.5
OS_SPHEQUIV: 0
OD_SPHEQUIV: -1.75

## 2023-03-06 ASSESSMENT — REFRACTION_CURRENTRX
OD_OVR_VA: 20/
OS_OVR_VA: 20/
OS_SPHERE: +2.00
OD_SPHERE: +2.00

## 2023-03-06 ASSESSMENT — AXIALLENGTH_DERIVED
OS_AL: 23.965
OD_AL: 24.4843
OD_AL: 24.5897

## 2023-03-06 ASSESSMENT — KERATOMETRY
OS_K2POWER_DIOPTERS: 42.75
OS_AXISANGLE_DEGREES: 094
OD_K2POWER_DIOPTERS: 43.00
OD_AXISANGLE_DEGREES: 059
OD_K1POWER_DIOPTERS: 42.50
OS_K1POWER_DIOPTERS: 42.25

## 2023-03-06 ASSESSMENT — VISUAL ACUITY
OS_BCVA: 20/60
OD_BCVA: 20/25-1

## 2023-03-07 ENCOUNTER — ASC (OUTPATIENT)
Dept: URBAN - METROPOLITAN AREA SURGERY 8 | Facility: SURGERY | Age: 78
Setting detail: OPHTHALMOLOGY
End: 2023-03-07
Payer: MEDICARE

## 2023-03-07 DIAGNOSIS — H40.1111: ICD-10-CM

## 2023-03-07 PROCEDURE — 65855 TRABECULOPLASTY LASER SURG: CPT | Performed by: OPHTHALMOLOGY

## 2023-03-07 ASSESSMENT — REFRACTION_MANIFEST
OD_ADD: +2.50
OS_SPHERE: PLANO
OD_CYLINDER: -1.50
OS_ADD: +2.50
OD_VA1: 20/25-2
OS_AXIS: 105
OS_CYLINDER: -0.50
OU_VA: 20/25-1
OD_AXIS: 095
OD_SPHERE: -0.75
OS_VA1: 20/30-2

## 2023-03-07 ASSESSMENT — KERATOMETRY
OD_K2POWER_DIOPTERS: 43.00
OS_AXISANGLE_DEGREES: 094
OS_K2POWER_DIOPTERS: 42.75
OD_AXISANGLE_DEGREES: 059
OS_K1POWER_DIOPTERS: 42.25
OD_K1POWER_DIOPTERS: 42.50

## 2023-03-07 ASSESSMENT — AXIALLENGTH_DERIVED
OD_AL: 24.4843
OD_AL: 24.5897
OS_AL: 23.965

## 2023-03-07 ASSESSMENT — SPHEQUIV_DERIVED
OD_SPHEQUIV: -1.5
OS_SPHEQUIV: 0
OD_SPHEQUIV: -1.75

## 2023-03-07 ASSESSMENT — REFRACTION_AUTOREFRACTION
OD_SPHERE: -1.00
OS_AXIS: 109
OD_AXIS: 095
OD_CYLINDER: -1.50
OS_CYLINDER: -0.50
OS_SPHERE: +0.25

## 2023-03-07 ASSESSMENT — VISUAL ACUITY
OD_BCVA: 20/25-1
OS_BCVA: 20/60

## 2023-03-07 ASSESSMENT — REFRACTION_CURRENTRX
OD_SPHERE: +2.00
OS_OVR_VA: 20/
OS_SPHERE: +2.00
OD_OVR_VA: 20/

## 2023-03-10 ENCOUNTER — ASC (OUTPATIENT)
Dept: URBAN - METROPOLITAN AREA SURGERY 8 | Facility: SURGERY | Age: 78
Setting detail: OPHTHALMOLOGY
End: 2023-03-10
Payer: MEDICARE

## 2023-03-10 DIAGNOSIS — H40.1121: ICD-10-CM

## 2023-03-10 PROCEDURE — 65855 TRABECULOPLASTY LASER SURG: CPT | Performed by: OPHTHALMOLOGY

## 2023-03-10 ASSESSMENT — KERATOMETRY
OD_K2POWER_DIOPTERS: 43.00
OS_AXISANGLE_DEGREES: 094
OD_AXISANGLE_DEGREES: 059
OS_K2POWER_DIOPTERS: 42.75
OS_K1POWER_DIOPTERS: 42.25
OD_K1POWER_DIOPTERS: 42.50

## 2023-03-10 ASSESSMENT — AXIALLENGTH_DERIVED
OS_AL: 23.965
OD_AL: 24.4843
OD_AL: 24.5897

## 2023-03-10 ASSESSMENT — REFRACTION_AUTOREFRACTION
OD_CYLINDER: -1.50
OS_SPHERE: +0.25
OS_AXIS: 109
OD_AXIS: 095
OS_CYLINDER: -0.50
OD_SPHERE: -1.00

## 2023-03-10 ASSESSMENT — SPHEQUIV_DERIVED
OD_SPHEQUIV: -1.5
OD_SPHEQUIV: -1.75
OS_SPHEQUIV: 0

## 2023-03-10 ASSESSMENT — REFRACTION_MANIFEST
OD_SPHERE: -0.75
OS_ADD: +2.50
OS_VA1: 20/30-2
OS_SPHERE: PLANO
OD_AXIS: 095
OU_VA: 20/25-1
OD_ADD: +2.50
OD_CYLINDER: -1.50
OD_VA1: 20/25-2
OS_CYLINDER: -0.50
OS_AXIS: 105

## 2023-03-10 ASSESSMENT — VISUAL ACUITY
OS_BCVA: 20/60
OD_BCVA: 20/25-1

## 2023-03-10 ASSESSMENT — REFRACTION_CURRENTRX
OS_OVR_VA: 20/
OS_SPHERE: +2.00
OD_SPHERE: +2.00
OD_OVR_VA: 20/

## 2023-04-04 ENCOUNTER — OFFICE (OUTPATIENT)
Dept: URBAN - METROPOLITAN AREA CLINIC 94 | Facility: CLINIC | Age: 78
Setting detail: OPHTHALMOLOGY
End: 2023-04-04
Payer: MEDICARE

## 2023-04-04 DIAGNOSIS — H40.1121: ICD-10-CM

## 2023-04-04 DIAGNOSIS — H25.13: ICD-10-CM

## 2023-04-04 PROCEDURE — 92012 INTRM OPH EXAM EST PATIENT: CPT | Performed by: OPHTHALMOLOGY

## 2023-04-04 ASSESSMENT — REFRACTION_MANIFEST
OD_SPHERE: -0.75
OD_CYLINDER: -1.50
OS_VA1: 20/30-2
OD_AXIS: 095
OD_VA1: 20/25-2
OS_ADD: +2.50
OS_CYLINDER: -0.50
OS_SPHERE: PLANO
OS_AXIS: 105
OU_VA: 20/25-1
OD_ADD: +2.50

## 2023-04-04 ASSESSMENT — SPHEQUIV_DERIVED
OD_SPHEQUIV: -1.375
OS_SPHEQUIV: 0.25
OD_SPHEQUIV: -1.5

## 2023-04-04 ASSESSMENT — KERATOMETRY
OD_K1POWER_DIOPTERS: 42.50
OS_K2POWER_DIOPTERS: 42.75
OS_AXISANGLE_DEGREES: 084
OD_K2POWER_DIOPTERS: 42.50
OS_K1POWER_DIOPTERS: 42.25
OD_AXISANGLE_DEGREES: 090

## 2023-04-04 ASSESSMENT — VISUAL ACUITY
OD_BCVA: 20/25
OS_BCVA: 20/80

## 2023-04-04 ASSESSMENT — CONFRONTATIONAL VISUAL FIELD TEST (CVF)
OS_FINDINGS: FULL
OD_FINDINGS: FULL

## 2023-04-04 ASSESSMENT — REFRACTION_CURRENTRX
OD_OVR_VA: 20/
OD_SPHERE: +2.00
OS_SPHERE: +2.00
OS_OVR_VA: 20/

## 2023-04-04 ASSESSMENT — REFRACTION_AUTOREFRACTION
OS_CYLINDER: -0.50
OD_SPHERE: -0.75
OD_AXIS: 088
OD_CYLINDER: -1.25
OS_SPHERE: +0.50
OS_AXIS: 110

## 2023-04-04 ASSESSMENT — AXIALLENGTH_DERIVED
OD_AL: 24.5309
OS_AL: 23.8649
OD_AL: 24.5836

## 2023-04-04 ASSESSMENT — LID EXAM ASSESSMENTS: OD_ANGULAR_BLEPHARITIS: RUL 1+

## 2023-04-04 ASSESSMENT — TONOMETRY: OS_IOP_MMHG: 18

## 2023-05-02 ENCOUNTER — OFFICE (OUTPATIENT)
Dept: URBAN - METROPOLITAN AREA CLINIC 94 | Facility: CLINIC | Age: 78
Setting detail: OPHTHALMOLOGY
End: 2023-05-02
Payer: MEDICARE

## 2023-05-02 DIAGNOSIS — H40.1111: ICD-10-CM

## 2023-05-02 DIAGNOSIS — H25.11: ICD-10-CM

## 2023-05-02 DIAGNOSIS — H25.13: ICD-10-CM

## 2023-05-02 DIAGNOSIS — H40.1121: ICD-10-CM

## 2023-05-02 DIAGNOSIS — H25.12: ICD-10-CM

## 2023-05-02 PROCEDURE — 92136 OPHTHALMIC BIOMETRY: CPT | Performed by: OPHTHALMOLOGY

## 2023-05-02 PROCEDURE — 99214 OFFICE O/P EST MOD 30 MIN: CPT | Performed by: OPHTHALMOLOGY

## 2023-05-02 ASSESSMENT — REFRACTION_MANIFEST
OS_VA1: 20/25
OD_ADD: +2.50
OU_VA: 20/25-1
OS_AXIS: 105
OD_VA1: 20/50
OS_SPHERE: PLANO
OS_ADD: +2.50
OS_CYLINDER: -0.50
OD_AXIS: 095
OD_SPHERE: -0.75
OD_CYLINDER: -1.50

## 2023-05-02 ASSESSMENT — TONOMETRY
OS_IOP_MMHG: 18
OD_IOP_MMHG: 18

## 2023-05-02 ASSESSMENT — KERATOMETRY
OS_CYLPOWER_DEGREES: 0.5
OS_K2POWER_DIOPTERS: 42.75
OS_AXISANGLE_DEGREES: 086
OD_AXISANGLE_DEGREES: 057
OD_K2POWER_DIOPTERS: 42.75
OD_K2POWER_DIOPTERS: 42.75
OD_AXISANGLE_DEGREES: 057
OD_K1POWER_DIOPTERS: 42.50
OD_K1POWER_DIOPTERS: 42.50
OS_K1POWER_DIOPTERS: 42.25
OD_K1K2_AVERAGE: 42.625
OS_K2POWER_DIOPTERS: 42.75
OD_CYLAXISANGLE_DEGREES: 57
OD_CYLPOWER_DEGREES: 0.25
OD_AXISANGLE2_DEGREES: 057
OS_CYLAXISANGLE_DEGREES: 86
OS_AXISANGLE2_DEGREES: 086
OS_AXISANGLE_DEGREES: 086
OS_K1K2_AVERAGE: 42.5
OS_K1POWER_DIOPTERS: 42.25

## 2023-05-02 ASSESSMENT — CONFRONTATIONAL VISUAL FIELD TEST (CVF)
OD_FINDINGS: FULL
OS_FINDINGS: FULL

## 2023-05-02 ASSESSMENT — VISUAL ACUITY
OS_BCVA: 20/50
OD_BCVA: 20/25

## 2023-05-02 ASSESSMENT — REFRACTION_AUTOREFRACTION
OD_AXIS: 091
OS_CYLINDER: -1.00
OS_SPHERE: +0.75
OS_AXIS: 103
OD_SPHERE: -1.00
OD_CYLINDER: -1.25

## 2023-05-02 ASSESSMENT — SPHEQUIV_DERIVED
OD_SPHEQUIV: -1.625
OS_SPHEQUIV: 0.25
OD_SPHEQUIV: -1.5

## 2023-05-02 ASSESSMENT — LID EXAM ASSESSMENTS: OD_ANGULAR_BLEPHARITIS: RUL 1+

## 2023-05-02 ASSESSMENT — AXIALLENGTH_DERIVED
OD_AL: 24.5867
OD_AL: 24.5339
OS_AL: 23.8649

## 2023-05-02 ASSESSMENT — REFRACTION_CURRENTRX
OD_OVR_VA: 20/
OS_SPHERE: +2.00
OS_OVR_VA: 20/
OD_SPHERE: +2.00

## 2023-07-21 ENCOUNTER — ASC (OUTPATIENT)
Dept: URBAN - METROPOLITAN AREA SURGERY 8 | Facility: SURGERY | Age: 78
Setting detail: OPHTHALMOLOGY
End: 2023-07-21
Payer: MEDICARE

## 2023-07-21 DIAGNOSIS — H52.211: ICD-10-CM

## 2023-07-21 DIAGNOSIS — H40.1113: ICD-10-CM

## 2023-07-21 DIAGNOSIS — H25.11: ICD-10-CM

## 2023-07-21 PROCEDURE — 68841 INSJ RX ELUT IMPLT LAC CANAL: CPT | Performed by: OPHTHALMOLOGY

## 2023-07-21 PROCEDURE — 65820 GONIOTOMY: CPT | Performed by: OPHTHALMOLOGY

## 2023-07-21 PROCEDURE — FEMTO FEMTOSECOND LASER: Performed by: OPHTHALMOLOGY

## 2023-07-21 PROCEDURE — A9270 NON-COVERED ITEM OR SERVICE: HCPCS | Performed by: OPHTHALMOLOGY

## 2023-07-21 PROCEDURE — 66991 XCAPSL CTRC RMVL INSJ 1+: CPT | Performed by: OPHTHALMOLOGY

## 2023-07-22 ENCOUNTER — RX ONLY (RX ONLY)
Age: 78
End: 2023-07-22

## 2023-07-22 ENCOUNTER — OFFICE (OUTPATIENT)
Dept: URBAN - METROPOLITAN AREA CLINIC 94 | Facility: CLINIC | Age: 78
Setting detail: OPHTHALMOLOGY
End: 2023-07-22
Payer: MEDICARE

## 2023-07-22 DIAGNOSIS — H40.1121: ICD-10-CM

## 2023-07-22 DIAGNOSIS — Z96.1: ICD-10-CM

## 2023-07-22 DIAGNOSIS — H40.1111: ICD-10-CM

## 2023-07-22 PROCEDURE — 99024 POSTOP FOLLOW-UP VISIT: CPT | Performed by: REGISTERED NURSE

## 2023-07-22 ASSESSMENT — REFRACTION_MANIFEST
OU_VA: 20/25-1
OS_AXIS: 105
OS_ADD: +2.50
OS_CYLINDER: -0.50
OD_SPHERE: -0.75
OD_VA1: 20/50
OS_SPHERE: PLANO
OD_ADD: +2.50
OS_VA1: 20/25
OD_AXIS: 095
OD_CYLINDER: -1.50

## 2023-07-22 ASSESSMENT — REFRACTION_AUTOREFRACTION
OD_SPHERE: 0.00
OD_CYLINDER: -0.75
OS_SPHERE: +0.50
OS_CYLINDER: -0.50
OS_AXIS: 126
OD_AXIS: 081

## 2023-07-22 ASSESSMENT — CORNEAL EDEMA CLINICAL DESCRIPTION: OD_CORNEALEDEMA: 1+

## 2023-07-22 ASSESSMENT — KERATOMETRY
OD_AXISANGLE_DEGREES: 165
OD_K1POWER_DIOPTERS: 42.50
OS_K2POWER_DIOPTERS: 43.00
OS_K1POWER_DIOPTERS: 42.25
OS_AXISANGLE_DEGREES: 080
OD_K2POWER_DIOPTERS: 42.75

## 2023-07-22 ASSESSMENT — VISUAL ACUITY
OD_BCVA: 20/20-1
OS_BCVA: 20/20

## 2023-07-22 ASSESSMENT — AXIALLENGTH_DERIVED
OD_AL: 24.0689
OS_AL: 23.818
OD_AL: 24.5339

## 2023-07-22 ASSESSMENT — SPHEQUIV_DERIVED
OS_SPHEQUIV: 0.25
OD_SPHEQUIV: -1.5
OD_SPHEQUIV: -0.375

## 2023-07-22 ASSESSMENT — REFRACTION_CURRENTRX
OS_OVR_VA: 20/
OS_SPHERE: +2.00
OD_SPHERE: +2.00
OD_OVR_VA: 20/

## 2023-07-22 ASSESSMENT — CONFRONTATIONAL VISUAL FIELD TEST (CVF)
OD_FINDINGS: FULL
OS_FINDINGS: FULL

## 2023-07-22 ASSESSMENT — TONOMETRY
OD_IOP_MMHG: 12
OD_IOP_MMHG: 14

## 2023-07-22 ASSESSMENT — LID EXAM ASSESSMENTS: OD_ANGULAR_BLEPHARITIS: RUL 1+

## 2023-07-28 ENCOUNTER — OFFICE (OUTPATIENT)
Dept: URBAN - METROPOLITAN AREA CLINIC 94 | Facility: CLINIC | Age: 78
Setting detail: OPHTHALMOLOGY
End: 2023-07-28
Payer: MEDICARE

## 2023-07-28 ENCOUNTER — RX ONLY (RX ONLY)
Age: 78
End: 2023-07-28

## 2023-07-28 DIAGNOSIS — H40.1121: ICD-10-CM

## 2023-07-28 DIAGNOSIS — H25.12: ICD-10-CM

## 2023-07-28 DIAGNOSIS — H40.1111: ICD-10-CM

## 2023-07-28 DIAGNOSIS — Z96.1: ICD-10-CM

## 2023-07-28 PROCEDURE — 99024 POSTOP FOLLOW-UP VISIT: CPT | Performed by: PHYSICIAN ASSISTANT

## 2023-07-28 PROCEDURE — 92136 OPHTHALMIC BIOMETRY: CPT | Performed by: PHYSICIAN ASSISTANT

## 2023-07-28 ASSESSMENT — REFRACTION_CURRENTRX
OS_SPHERE: +2.00
OD_SPHERE: +2.00
OD_OVR_VA: 20/
OS_OVR_VA: 20/

## 2023-07-28 ASSESSMENT — REFRACTION_MANIFEST
OU_VA: 20/25-1
OD_CYLINDER: -1.50
OS_CYLINDER: -0.50
OD_VA1: 20/50
OD_SPHERE: -0.75
OS_ADD: +2.50
OS_AXIS: 105
OS_VA1: 20/25
OD_AXIS: 095
OD_ADD: +2.50
OS_SPHERE: PLANO

## 2023-07-28 ASSESSMENT — AXIALLENGTH_DERIVED
OD_AL: 24.1707
OS_AL: 23.8678
OD_AL: 24.5339

## 2023-07-28 ASSESSMENT — REFRACTION_AUTOREFRACTION
OS_SPHERE: +0.50
OS_AXIS: 119
OD_CYLINDER: -0.25
OD_AXIS: 106
OS_CYLINDER: -0.75
OD_SPHERE: -0.50

## 2023-07-28 ASSESSMENT — CONFRONTATIONAL VISUAL FIELD TEST (CVF)
OS_FINDINGS: FULL
OD_FINDINGS: FULL

## 2023-07-28 ASSESSMENT — KERATOMETRY
OS_AXISANGLE_DEGREES: 088
OS_K1POWER_DIOPTERS: 42.50
OD_K2POWER_DIOPTERS: 42.75
OD_AXISANGLE_DEGREES: 093
OS_K2POWER_DIOPTERS: 42.75
OD_K1POWER_DIOPTERS: 42.50

## 2023-07-28 ASSESSMENT — LID EXAM ASSESSMENTS: OD_ANGULAR_BLEPHARITIS: RUL 1+

## 2023-07-28 ASSESSMENT — SPHEQUIV_DERIVED
OS_SPHEQUIV: 0.125
OD_SPHEQUIV: -0.625
OD_SPHEQUIV: -1.5

## 2023-07-28 ASSESSMENT — VISUAL ACUITY
OD_BCVA: 20/25
OS_BCVA: 20/25

## 2023-08-04 ENCOUNTER — ASC (OUTPATIENT)
Dept: URBAN - METROPOLITAN AREA SURGERY 8 | Facility: SURGERY | Age: 78
Setting detail: OPHTHALMOLOGY
End: 2023-08-04
Payer: MEDICARE

## 2023-08-04 DIAGNOSIS — H52.212: ICD-10-CM

## 2023-08-04 DIAGNOSIS — H40.1123: ICD-10-CM

## 2023-08-04 DIAGNOSIS — H25.12: ICD-10-CM

## 2023-08-04 PROCEDURE — A9270 NON-COVERED ITEM OR SERVICE: HCPCS | Performed by: OPHTHALMOLOGY

## 2023-08-04 PROCEDURE — 66991 XCAPSL CTRC RMVL INSJ 1+: CPT | Performed by: OPHTHALMOLOGY

## 2023-08-04 PROCEDURE — 68841 INSJ RX ELUT IMPLT LAC CANAL: CPT | Performed by: OPHTHALMOLOGY

## 2023-08-04 PROCEDURE — FEMTO FEMTOSECOND LASER: Performed by: OPHTHALMOLOGY

## 2023-08-04 PROCEDURE — 65820 GONIOTOMY: CPT | Performed by: OPHTHALMOLOGY

## 2023-08-05 ENCOUNTER — OFFICE (OUTPATIENT)
Dept: URBAN - METROPOLITAN AREA CLINIC 94 | Facility: CLINIC | Age: 78
Setting detail: OPHTHALMOLOGY
End: 2023-08-05
Payer: MEDICARE

## 2023-08-05 DIAGNOSIS — H40.1111: ICD-10-CM

## 2023-08-05 DIAGNOSIS — Z96.1: ICD-10-CM

## 2023-08-05 DIAGNOSIS — H40.1121: ICD-10-CM

## 2023-08-05 DIAGNOSIS — H40.041: ICD-10-CM

## 2023-08-05 PROCEDURE — 99024 POSTOP FOLLOW-UP VISIT: CPT | Performed by: REGISTERED NURSE

## 2023-08-05 ASSESSMENT — SPHEQUIV_DERIVED
OS_SPHEQUIV: -0.375
OD_SPHEQUIV: -1.5
OD_SPHEQUIV: -0.375

## 2023-08-05 ASSESSMENT — AXIALLENGTH_DERIVED
OD_AL: 24.5339
OS_AL: 24.0689
OD_AL: 24.0689

## 2023-08-05 ASSESSMENT — REFRACTION_MANIFEST
OD_ADD: +2.50
OU_VA: 20/25-1
OS_SPHERE: PLANO
OS_VA1: 20/25
OS_ADD: +2.50
OS_CYLINDER: -0.50
OD_SPHERE: -0.75
OD_VA1: 20/50
OS_AXIS: 105
OD_AXIS: 095
OD_CYLINDER: -1.50

## 2023-08-05 ASSESSMENT — REFRACTION_CURRENTRX
OD_OVR_VA: 20/
OD_SPHERE: +2.00
OS_SPHERE: +2.00
OS_OVR_VA: 20/

## 2023-08-05 ASSESSMENT — CONFRONTATIONAL VISUAL FIELD TEST (CVF)
OS_FINDINGS: FULL
OD_FINDINGS: FULL

## 2023-08-05 ASSESSMENT — KERATOMETRY
OD_K2POWER_DIOPTERS: 42.75
OD_K1POWER_DIOPTERS: 42.50
OS_AXISANGLE_DEGREES: 136
OS_K1POWER_DIOPTERS: 42.50
OD_AXISANGLE_DEGREES: 064
OS_K2POWER_DIOPTERS: 42.75

## 2023-08-05 ASSESSMENT — TONOMETRY
OS_IOP_MMHG: 20
OS_IOP_MMHG: 18

## 2023-08-05 ASSESSMENT — REFRACTION_AUTOREFRACTION
OD_CYLINDER: -0.75
OD_AXIS: 095
OD_SPHERE: 0.00
OS_AXIS: 095
OS_SPHERE: 0.00
OS_CYLINDER: -0.75

## 2023-08-05 ASSESSMENT — LACRIMAL DUCT - ASSESSMENT
OD_LACRIMAL_DUCT: DEXTENZA
OS_LACRIMAL_DUCT: DEXTENZA

## 2023-08-05 ASSESSMENT — LID EXAM ASSESSMENTS: OD_ANGULAR_BLEPHARITIS: RUL 1+

## 2023-08-05 ASSESSMENT — CORNEAL EDEMA - FOLDS/STRIAE: OS_FOLDSSTRIAE: 3+

## 2023-08-05 ASSESSMENT — VISUAL ACUITY
OS_BCVA: 20/20-
OD_BCVA: 20/25-

## 2023-08-11 ENCOUNTER — OFFICE (OUTPATIENT)
Dept: URBAN - METROPOLITAN AREA CLINIC 94 | Facility: CLINIC | Age: 78
Setting detail: OPHTHALMOLOGY
End: 2023-08-11
Payer: MEDICARE

## 2023-08-11 ENCOUNTER — RX ONLY (RX ONLY)
Age: 78
End: 2023-08-11

## 2023-08-11 DIAGNOSIS — H40.1111: ICD-10-CM

## 2023-08-11 DIAGNOSIS — Z96.1: ICD-10-CM

## 2023-08-11 DIAGNOSIS — H40.1121: ICD-10-CM

## 2023-08-11 DIAGNOSIS — H40.041: ICD-10-CM

## 2023-08-11 PROCEDURE — 99024 POSTOP FOLLOW-UP VISIT: CPT | Performed by: PHYSICIAN ASSISTANT

## 2023-08-11 ASSESSMENT — REFRACTION_MANIFEST
OD_VA1: 20/50
OS_VA1: 20/25
OD_SPHERE: -0.75
OS_CYLINDER: -0.50
OS_SPHERE: PLANO
OD_ADD: +2.50
OS_ADD: +2.50
OS_AXIS: 105
OD_CYLINDER: -1.50
OD_AXIS: 095
OU_VA: 20/25-1

## 2023-08-11 ASSESSMENT — REFRACTION_AUTOREFRACTION
OS_AXIS: 132
OD_CYLINDER: -0.75
OS_CYLINDER: -0.25
OD_SPHERE: +0.25
OS_SPHERE: +0.50
OD_AXIS: 106

## 2023-08-11 ASSESSMENT — LACRIMAL DUCT - ASSESSMENT
OS_LACRIMAL_DUCT: DEXTENZA
OD_LACRIMAL_DUCT: DEXTENZA

## 2023-08-11 ASSESSMENT — REFRACTION_CURRENTRX
OD_SPHERE: +2.00
OS_SPHERE: +2.00
OD_OVR_VA: 20/
OS_OVR_VA: 20/

## 2023-08-11 ASSESSMENT — CONFRONTATIONAL VISUAL FIELD TEST (CVF)
OD_FINDINGS: FULL
OS_FINDINGS: FULL

## 2023-08-11 ASSESSMENT — TONOMETRY
OS_IOP_MMHG: 18
OD_IOP_MMHG: 20
OS_IOP_MMHG: 14

## 2023-08-11 ASSESSMENT — LID EXAM ASSESSMENTS: OD_ANGULAR_BLEPHARITIS: RUL 1+

## 2023-08-11 ASSESSMENT — SPHEQUIV_DERIVED
OD_SPHEQUIV: -0.125
OS_SPHEQUIV: 0.375
OD_SPHEQUIV: -1.5

## 2023-08-11 ASSESSMENT — KERATOMETRY
OS_AXISANGLE_DEGREES: 087
OD_K2POWER_DIOPTERS: 42.50
OD_AXISANGLE_DEGREES: 090
OS_K1POWER_DIOPTERS: 42.25
OS_K2POWER_DIOPTERS: 42.75
OD_K1POWER_DIOPTERS: 42.50

## 2023-08-11 ASSESSMENT — AXIALLENGTH_DERIVED
OD_AL: 24.5836
OD_AL: 24.0154
OS_AL: 23.8152

## 2023-08-11 ASSESSMENT — VISUAL ACUITY
OS_BCVA: 20/20-1
OD_BCVA: 20/20-1

## 2023-09-08 ENCOUNTER — OFFICE (OUTPATIENT)
Dept: URBAN - METROPOLITAN AREA CLINIC 94 | Facility: CLINIC | Age: 78
Setting detail: OPHTHALMOLOGY
End: 2023-09-08
Payer: MEDICARE

## 2023-09-08 DIAGNOSIS — H40.1111: ICD-10-CM

## 2023-09-08 DIAGNOSIS — Z96.1: ICD-10-CM

## 2023-09-08 DIAGNOSIS — H40.1121: ICD-10-CM

## 2023-09-08 DIAGNOSIS — H40.041: ICD-10-CM

## 2023-09-08 PROCEDURE — 99024 POSTOP FOLLOW-UP VISIT: CPT | Performed by: OPHTHALMOLOGY

## 2023-09-08 ASSESSMENT — REFRACTION_MANIFEST
OD_ADD: +2.50
OS_VA1: 20/25
OS_ADD: +2.50
OD_CYLINDER: -1.50
OS_SPHERE: PLANO
OD_VA1: 20/50
OS_CYLINDER: -0.50
OS_AXIS: 105
OD_AXIS: 095
OU_VA: 20/25-1
OD_SPHERE: -0.75

## 2023-09-08 ASSESSMENT — VISUAL ACUITY
OD_BCVA: 20/20
OS_BCVA: 20/20

## 2023-09-08 ASSESSMENT — TONOMETRY
OD_IOP_MMHG: 13
OS_IOP_MMHG: 10
OD_IOP_MMHG: 15
OS_IOP_MMHG: 10

## 2023-09-08 ASSESSMENT — LID EXAM ASSESSMENTS: OD_ANGULAR_BLEPHARITIS: RUL 1+

## 2023-09-08 ASSESSMENT — REFRACTION_AUTOREFRACTION
OD_CYLINDER: +1.50
OS_AXIS: 084
OS_SPHERE: +0.75
OS_CYLINDER: -0.50
OD_SPHERE: -0.5-
OD_AXIS: 123

## 2023-09-08 ASSESSMENT — KERATOMETRY
OS_K1POWER_DIOPTERS: 42.50
OD_AXISANGLE_DEGREES: 090
OS_AXISANGLE_DEGREES: 090
OS_K2POWER_DIOPTERS: 42.50
OD_K1POWER_DIOPTERS: 42.50
OD_K2POWER_DIOPTERS: 42.50

## 2023-09-08 ASSESSMENT — REFRACTION_CURRENTRX
OS_SPHERE: +2.00
OD_SPHERE: +2.00
OD_OVR_VA: 20/
OS_OVR_VA: 20/

## 2023-09-08 ASSESSMENT — AXIALLENGTH_DERIVED
OD_AL: 24.5836
OS_AL: 23.7657

## 2023-09-08 ASSESSMENT — LACRIMAL DUCT - ASSESSMENT
OS_LACRIMAL_DUCT: DEXTENZA
OD_LACRIMAL_DUCT: DEXTENZA

## 2023-09-08 ASSESSMENT — SPHEQUIV_DERIVED
OS_SPHEQUIV: 0.5
OD_SPHEQUIV: -1.5

## 2023-09-08 ASSESSMENT — CONFRONTATIONAL VISUAL FIELD TEST (CVF)
OD_FINDINGS: FULL
OS_FINDINGS: FULL

## 2023-12-01 ENCOUNTER — OFFICE (OUTPATIENT)
Dept: URBAN - METROPOLITAN AREA CLINIC 94 | Facility: CLINIC | Age: 78
Setting detail: OPHTHALMOLOGY
End: 2023-12-01
Payer: MEDICARE

## 2023-12-01 DIAGNOSIS — H40.1121: ICD-10-CM

## 2023-12-01 DIAGNOSIS — H40.1111: ICD-10-CM

## 2023-12-01 DIAGNOSIS — H40.041: ICD-10-CM

## 2023-12-01 DIAGNOSIS — Z96.1: ICD-10-CM

## 2023-12-01 PROCEDURE — 92014 COMPRE OPH EXAM EST PT 1/>: CPT | Performed by: OPHTHALMOLOGY

## 2023-12-01 PROCEDURE — 92083 EXTENDED VISUAL FIELD XM: CPT | Performed by: OPHTHALMOLOGY

## 2023-12-01 PROCEDURE — 92250 FUNDUS PHOTOGRAPHY W/I&R: CPT | Performed by: OPHTHALMOLOGY

## 2023-12-01 ASSESSMENT — REFRACTION_MANIFEST
OS_CYLINDER: -0.50
OD_ADD: +2.50
OU_VA: 20/25-1
OD_AXIS: 095
OS_AXIS: 105
OD_VA1: 20/50
OS_ADD: +2.50
OS_SPHERE: PLANO
OD_CYLINDER: -1.50
OS_VA1: 20/25
OD_SPHERE: -0.75

## 2023-12-01 ASSESSMENT — CONFRONTATIONAL VISUAL FIELD TEST (CVF)
OS_FINDINGS: FULL
OD_FINDINGS: FULL

## 2023-12-01 ASSESSMENT — LACRIMAL DUCT - ASSESSMENT
OS_LACRIMAL_DUCT: DEXTENZA
OD_LACRIMAL_DUCT: DEXTENZA

## 2023-12-01 ASSESSMENT — SPHEQUIV_DERIVED
OD_SPHEQUIV: -0.125
OS_SPHEQUIV: 0.625
OD_SPHEQUIV: -1.5

## 2023-12-01 ASSESSMENT — LID EXAM ASSESSMENTS: OD_ANGULAR_BLEPHARITIS: RUL 1+

## 2023-12-01 ASSESSMENT — REFRACTION_CURRENTRX
OS_SPHERE: +2.00
OS_OVR_VA: 20/
OD_SPHERE: +2.00
OD_OVR_VA: 20/

## 2023-12-01 ASSESSMENT — REFRACTION_AUTOREFRACTION
OD_CYLINDER: -0.75
OD_AXIS: 104
OS_AXIS: 079
OS_SPHERE: +1.00
OD_SPHERE: +0.25
OS_CYLINDER: -0.75

## 2024-02-07 ENCOUNTER — APPOINTMENT (OUTPATIENT)
Dept: DERMATOLOGY | Facility: CLINIC | Age: 79
End: 2024-02-07
Payer: MEDICARE

## 2024-02-07 PROCEDURE — 17004 DESTROY PREMAL LESIONS 15/>: CPT

## 2024-02-07 PROCEDURE — 99213 OFFICE O/P EST LOW 20 MIN: CPT | Mod: 25

## 2024-03-06 ENCOUNTER — RESULT REVIEW (OUTPATIENT)
Age: 79
End: 2024-03-06

## 2024-03-07 ENCOUNTER — APPOINTMENT (OUTPATIENT)
Dept: DERMATOLOGY | Facility: CLINIC | Age: 79
End: 2024-03-07
Payer: MEDICARE

## 2024-03-07 PROCEDURE — 99213 OFFICE O/P EST LOW 20 MIN: CPT | Mod: 25

## 2024-03-07 PROCEDURE — 69100 BIOPSY OF EXTERNAL EAR: CPT

## 2024-03-07 PROCEDURE — 11103 TANGNTL BX SKIN EA SEP/ADDL: CPT | Mod: 59

## 2024-03-07 PROCEDURE — 11102 TANGNTL BX SKIN SINGLE LES: CPT | Mod: 59

## 2024-04-02 ENCOUNTER — APPOINTMENT (OUTPATIENT)
Dept: DERMATOLOGY | Facility: CLINIC | Age: 79
End: 2024-04-02
Payer: MEDICARE

## 2024-04-02 PROCEDURE — 17262 DSTRJ MAL LES T/A/L 1.1-2.0: CPT

## 2024-04-02 PROCEDURE — 99213 OFFICE O/P EST LOW 20 MIN: CPT | Mod: 25

## 2024-04-19 ENCOUNTER — NON-APPOINTMENT (OUTPATIENT)
Age: 79
End: 2024-04-19

## 2024-04-19 ENCOUNTER — APPOINTMENT (OUTPATIENT)
Dept: DERMATOLOGY | Facility: CLINIC | Age: 79
End: 2024-04-19
Payer: MEDICARE

## 2024-04-19 PROCEDURE — 17311 MOHS 1 STAGE H/N/HF/G: CPT

## 2024-04-19 PROCEDURE — 17312 MOHS ADDL STAGE: CPT

## 2024-04-19 RX ORDER — MUPIROCIN 20 MG/G
2 OINTMENT TOPICAL TWICE DAILY
Qty: 1 | Refills: 6 | Status: ACTIVE | COMMUNITY
Start: 2024-04-19 | End: 1900-01-01

## 2024-04-20 NOTE — HISTORY OF PRESENT ILLNESS
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 Harrell   We had the pleasure of seeing your patient in consultation for Mohs Micrographic Surgery.  Mr. JESSE WHITESIDE is a 78 year old M who presents for MMS for SCCis of the Left triangular fossa. He has a history of melanoma and other NMSC. The lesion had been there as a scaly spot x mos prior to bx. No prior treatment.   Upcoming travel plans: No  Pertinent positives noted below  History of HIV or hepatitis: No Blood thinners: None  Antibiotic Prophylaxis: None  Medical implants: None  The patient's review of systems questionnaire was reviewed. Education needs were identified. There were no barriers to learning.  Mohs surgery consultation for SCCis Left triangular Fossa  -- I explained the treatment options of topical immunomodulatory or chemotherapy, electrodessication and curettage, radiation therapy, excision and Mohs surgery.  I recommended Mohs surgery due to the tumor type, location, and ill-defined nature of cancer.   Mohs Appropriate Use Criteria (AUC) Score: 7  I explained that following extirpation there will be a full thickness defect of the involved area. The reconstructive options will be based on the defect size and surrounding tissue laxity of the involved area. Primary closure is only possible for smaller defects. For larger defects, local tissue rearrangement or skin grafting may be necessary. Risks following layered primary closure or local tissue rearrangement include wound dehiscence, contour irregularity, bleeding, infection, and paresthesia (nerve damage including sensory deficit or motor weakness). Risks following skin grafting include wound dehiscence, skin graft nonadherence (partial or complete), contour irregularity, bleeding, infection, paresthesia, and donor site complications. I explained that the patient will need to abstain from physical activity for 1-2 weeks following the surgery, that they would heal with a scar, and also discussed the chances of infection, bleeding, potential sensory or motor nerve damage, and recurrence.  The patient indicated that s/he understood the risks and wished to proceed today -- In particular, for reconstruction we discussed second intent healing.  Thank you for this Mohs surgery referral. We recommended that Mr. JESSE WHITESIDE follow up with His referring dermatologist for routine skin exams following surgery.

## 2024-04-20 NOTE — PHYSICAL EXAM
[Alert] : alert [Oriented x 3] : ~L oriented x 3 [Well Nourished] : well nourished [Conjunctiva Non-injected] : conjunctiva non-injected [No Visual Lymphadenopathy] : no visual  lymphadenopathy [No Clubbing] : no clubbing [No Edema] : no edema [No Bromhidrosis] : no bromhidrosis [No Chromhidrosis] : no chromhidrosis [Hair] : Hair [Scalp] : Scalp [Face] : Face [Nose] : Nose [Eyelids] : Eyelids [Lips] : Lips [Ears] : Ears [Neck] : Neck [Nodes] : Nodes [FreeTextEntry3] : Left triangular fossa with 1.1 cm x 0.9 cm scar

## 2024-04-20 NOTE — PROCEDURE
[TextEntry] : A surgical time out was taken to confirm the patient's correct identity and the correct site. The operative site was identified by the patient and surgical team prior to surgery and the patient agreed to proceed with the surgical site which was marked prior to anesthesia infiltration.   Mohs Micrographic Surgery Report  Date Collected: 04/19/2024  Date Received: Same as above Date Verified: Same as above Attending Surgeon: Catina Savage MD  Resident: Khadijah Armijo MD  Assistants: Johnnie Baig RN, Venita Lopez MA, Agustina hicks MA Procedure#:  Diagnosis: SCC Location: Left triangular fossa Pre-op Size: 1.1 cm x 0.9 cm  Post-Operative Final Defect Size: 1.5 cm x1.8 cm  Stages (number of pieces per stage): 2 (2/1, 1/2) Pathology, if tumor noted in stages: Debulk with full thickness epidermal keratinocyte atypia consistent with SCC in Situ. Stage 1 with focal full thickness epidermal keratinocyte atypia consistent with residual SCC in situ. Stage 2 with sparse perivascular lymphocytic infiltrate and no evidence of residual carcinoma.  Indications for procedure: Ill-defined tumor margins, high-priority anatomic location for preservation of normal tissue, aggressive histologic nature of the tumor Repair type: N/A  (second intention)  Total volume of anesthesia: 9 mL   Mohs Procedure Report:   The patient was escorted to the outpatient surgical operatory.  The proposed Mohs procedure and reconstruction options were discussed with the patient. The risks, benefits, and alternatives were discussed and the patient signed a written consent form. A time out was taken to confirm the patient's identity and the exact location of the skin cancer.  After the patient was placed in a recumbent position, the surgical site was cleaned with alcohol and either Betadine (for eyes and ears) or chlorhexidine gluconate, draped, and infiltrated with 1% buffered lidocaine with 1:100,000 epinephrine local anesthetic.  A sterile surgical marking pen was used to outline a thin margin of normal-appearing skin around the tumor. A beveled incision was then made using a scalpel. Small orienting nicks were made on the specimen and the surrounding skin. The tissue was then sharply dissected from the surrounding skin. Hemostasis was maintained with the electrosurgical unit and/or pressure. A temporary dressing was placed on the surgical defect until the frozen section slides were interpreted. The oriented specimen was placed in a Radha dish and transported to the Mohs laboratory.  For each stage of the procedure, a visual representation of the specimen was drawn on a Mohs map. This map graphically depicts the specimen's two-dimensional appearance, orientation, and tissue preparation, which consists of dividing the specimen and applying tissue dyes. Because the deep and peripheral portions of the tissue are then embedded in the same geometric plane, the map also represents an oriented scale drawing of the resulting histologic sections. The Mohs technician then prepared frozen section slides using standard techniques. The slides were stained with hematoxylin and eosin, and cover slips were applied.  The frozen section slides were then examined under the microscope. If tumor was found, it was localized on the map. The orienting nicks on the original specimen corresponded to similar nicks on the surgical defect so areas of identified tumor could be mapped back to the patient and resected. Additional layers of removed skin were then processed as indicated above. This iterative process continued as applicable until no tumor was observed microscopically. At this stage, the Mohs resection was complete.  Second Intention Healing After the surgical procedure was completed, the patient was brought back to the minor surgery operatory. Various reconstructive modalities were discussed with the patient. Second intention healing was selected as the best option. A pressure dressing composed of Vaseline ointment, Telfa, and sterile gauze and was securely taped into place. The patient was given complete verbal and written wound care instructions and was asked to follow up for a wound check as indicated. The patient ambulated from the minor surgery operatory without problems.

## 2024-04-20 NOTE — ASSESSMENT
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   Thank you for this dermatologic surgery referral. We recommended that the patient follow up with His referring dermatologist for routine skin exams following surgery.  Reason MD Janette Physician, Dermatology & Dermatologic Surgery [facility]

## 2024-04-20 NOTE — END OF VISIT
[FreeTextEntry3] : I personally saw and examined this patient with the resident physician and was present for the key portions of history taking, examination as well as the Mohs procedure performed .  I agree with the assessment and plan as documented in the resident's note unless noted below.   Catina Savage MD Physician, Dermatology and Dermatologic Surgery [facility]

## 2024-04-22 ENCOUNTER — APPOINTMENT (OUTPATIENT)
Dept: DERMATOLOGY | Facility: CLINIC | Age: 79
End: 2024-04-22
Payer: MEDICARE

## 2024-04-22 PROCEDURE — 99212 OFFICE O/P EST SF 10 MIN: CPT

## 2024-04-22 RX ORDER — MUPIROCIN 20 MG/G
2 OINTMENT TOPICAL TWICE DAILY
Qty: 1 | Refills: 6 | Status: ACTIVE | COMMUNITY
Start: 2024-04-22 | End: 1900-01-01

## 2024-04-23 NOTE — HISTORY OF PRESENT ILLNESS
[FreeTextEntry1] : Wound Check s/p Mohs surgery and second intent healing for SCCis of the Left triangular fossa 4/19/24 [de-identified] : 04/22/2024    Mr. JESSE WHITESIDE is a 78 year old M who presents for  follow up s/p Mohs as above. Has been doing well since surgery without issues.   ROS: Patient denies fever, chills, night sweats, unintentional weight loss or other constitutional symptoms

## 2024-04-23 NOTE — PHYSICAL EXAM
[Alert] : alert [Oriented x 3] : ~L oriented x 3 [Well Nourished] : well nourished [Conjunctiva Non-injected] : conjunctiva non-injected [No Visual Lymphadenopathy] : no visual  lymphadenopathy [No Clubbing] : no clubbing [No Edema] : no edema [No Bromhidrosis] : no bromhidrosis [No Chromhidrosis] : no chromhidrosis [Hair] : Hair [Scalp] : Scalp [Face] : Face [Nose] : Nose [Eyelids] : Eyelids [Ears] : Ears [Lips] : Lips [Neck] : Neck [Nodes] : Nodes [FreeTextEntry3] : Left triangular fossa with healing 1.5 cm wound with exposed cartilage, no bleeding

## 2024-04-23 NOTE — ASSESSMENT
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   Thank you for this dermatologic surgery referral. We recommended that the patient follow up with His referring dermatologist for routine skin exams following surgery.  Reason MD Janette Physician, Dermatology & Dermatologic Surgery [facility]

## 2024-04-29 ENCOUNTER — APPOINTMENT (OUTPATIENT)
Dept: DERMATOLOGY | Facility: CLINIC | Age: 79
End: 2024-04-29
Payer: MEDICARE

## 2024-05-01 ENCOUNTER — APPOINTMENT (OUTPATIENT)
Dept: DERMATOLOGY | Facility: CLINIC | Age: 79
End: 2024-05-01

## 2024-05-07 ENCOUNTER — APPOINTMENT (OUTPATIENT)
Dept: DERMATOLOGY | Facility: CLINIC | Age: 79
End: 2024-05-07
Payer: MEDICARE

## 2024-05-07 PROCEDURE — 99213 OFFICE O/P EST LOW 20 MIN: CPT

## 2024-05-07 NOTE — PHYSICAL EXAM
[Alert] : alert [Oriented x 3] : ~L oriented x 3 [Well Nourished] : well nourished [Conjunctiva Non-injected] : conjunctiva non-injected [No Visual Lymphadenopathy] : no visual  lymphadenopathy [No Clubbing] : no clubbing [No Edema] : no edema [No Bromhidrosis] : no bromhidrosis [No Chromhidrosis] : no chromhidrosis [Hair] : Hair [Scalp] : Scalp [Face] : Face [Nose] : Nose [Eyelids] : Eyelids [Ears] : Ears [Lips] : Lips [Neck] : Neck [Nodes] : Nodes [FreeTextEntry3] : Left triangular fossa with scab, removed and wound with some granulation and exposed cartilage

## 2024-05-07 NOTE — HISTORY OF PRESENT ILLNESS
[FreeTextEntry1] : Wound Check s/p Mohs surgery and second intent healing for SCCis of the Left triangular fossa 4/19/24 [de-identified] : 5/7/24  Mr. JESSE WHITESIDE is a 78 year old M who presents for  follow up s/p Mohs as above. Has been doing well since surgery without issues. He says he is covering the area with ointment, vaseline gauze and dressing but it keeps forming a scab.   ROS: Patient denies fever, chills, night sweats, unintentional weight loss or other constitutional symptoms

## 2024-05-07 NOTE — ASSESSMENT
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   Thank you for this dermatologic surgery referral. We recommended that the patient follow up with His referring dermatologist for routine skin exams following surgery.  Reason MD Janette Physician, Dermatology & Dermatologic Surgery [facility]

## 2024-05-14 ENCOUNTER — APPOINTMENT (OUTPATIENT)
Dept: DERMATOLOGY | Facility: CLINIC | Age: 79
End: 2024-05-14
Payer: MEDICARE

## 2024-05-14 PROCEDURE — 99213 OFFICE O/P EST LOW 20 MIN: CPT

## 2024-05-14 NOTE — ASSESSMENT
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   Thank you for this dermatologic surgery referral. We recommended that the patient follow up with His referring dermatologist for routine skin exams following surgery.  Reason MD Janette Physician, Dermatology & Dermatologic Surgery [facility]

## 2024-05-14 NOTE — HISTORY OF PRESENT ILLNESS
[FreeTextEntry1] : Wound Check s/p Mohs surgery and second intent healing for SCCis of the Left triangular fossa 4/19/24 [de-identified] : 5/14/24  Mr. JESSE WHITESIDE is a 78 year old M who presents for  follow up s/p Mohs as above. Has been doing well since surgery without issues. He says he is covering the area with ointment, vaseline gauze and dressing but it keeps forming a scab.   ROS: Patient denies fever, chills, night sweats, unintentional weight loss or other constitutional symptoms

## 2024-05-17 ENCOUNTER — APPOINTMENT (OUTPATIENT)
Dept: DERMATOLOGY | Facility: CLINIC | Age: 79
End: 2024-05-17
Payer: MEDICARE

## 2024-05-17 DIAGNOSIS — C44.229 SQUAMOUS CELL CARCINOMA OF SKIN OF LEFT EAR AND EXTERNAL AURICULAR CANAL: ICD-10-CM

## 2024-05-17 PROCEDURE — 99211 OFF/OP EST MAY X REQ PHY/QHP: CPT

## 2024-05-20 ENCOUNTER — APPOINTMENT (OUTPATIENT)
Dept: DERMATOLOGY | Facility: CLINIC | Age: 79
End: 2024-05-20
Payer: MEDICARE

## 2024-05-20 PROCEDURE — 99211 OFF/OP EST MAY X REQ PHY/QHP: CPT

## 2024-06-04 ENCOUNTER — APPOINTMENT (OUTPATIENT)
Dept: DERMATOLOGY | Facility: CLINIC | Age: 79
End: 2024-06-04
Payer: MEDICARE

## 2024-06-04 PROCEDURE — 99212 OFFICE O/P EST SF 10 MIN: CPT

## 2024-06-04 NOTE — PHYSICAL EXAM
[Alert] : alert [Oriented x 3] : ~L oriented x 3 [Well Nourished] : well nourished [Conjunctiva Non-injected] : conjunctiva non-injected [No Visual Lymphadenopathy] : no visual  lymphadenopathy [No Clubbing] : no clubbing [No Edema] : no edema [No Bromhidrosis] : no bromhidrosis [No Chromhidrosis] : no chromhidrosis [Hair] : Hair [Scalp] : Scalp [Face] : Face [Nose] : Nose [Eyelids] : Eyelids [Ears] : Ears [Lips] : Lips [Neck] : Neck [Nodes] : Nodes [FreeTextEntry3] : Left triangular fossa with fully healed wound

## 2024-06-04 NOTE — ASSESSMENT
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   Thank you for this dermatologic surgery referral. We recommended that the patient follow up with His referring dermatologist for routine skin exams following surgery.  Reason MD Janette Physician, Dermatology & Dermatologic Surgery [facility]

## 2024-06-04 NOTE — HISTORY OF PRESENT ILLNESS
[FreeTextEntry1] : Wound Check s/p Mohs surgery and second intent healing for SCCis of the Left triangular fossa 4/19/24 [de-identified] : 6/4/24  Mr. JESSE WHITESIDE is a 78 year old M who presents for  follow up s/p Mohs as above. Has been doing well since surgery without issues. he has been keeping the area covered and thinks it fully healed.   ROS: Patient denies fever, chills, night sweats, unintentional weight loss or other constitutional symptoms

## 2024-06-07 ENCOUNTER — OFFICE (OUTPATIENT)
Dept: URBAN - METROPOLITAN AREA CLINIC 94 | Facility: CLINIC | Age: 79
Setting detail: OPHTHALMOLOGY
End: 2024-06-07
Payer: MEDICARE

## 2024-06-07 DIAGNOSIS — H40.1111: ICD-10-CM

## 2024-06-07 DIAGNOSIS — H40.1121: ICD-10-CM

## 2024-06-07 DIAGNOSIS — Z96.1: ICD-10-CM

## 2024-06-07 DIAGNOSIS — H40.041: ICD-10-CM

## 2024-06-07 PROBLEM — C44.229 PRIMARY SQUAMOUS CELL CARCINOMA OF LEFT EAR: Status: ACTIVE | Noted: 2024-04-19

## 2024-06-07 PROCEDURE — 92014 COMPRE OPH EXAM EST PT 1/>: CPT | Performed by: OPHTHALMOLOGY

## 2024-06-07 PROCEDURE — 92133 CPTRZD OPH DX IMG PST SGM ON: CPT | Performed by: OPHTHALMOLOGY

## 2024-06-07 PROCEDURE — 92020 GONIOSCOPY: CPT | Performed by: OPHTHALMOLOGY

## 2024-06-07 ASSESSMENT — CONFRONTATIONAL VISUAL FIELD TEST (CVF)
OS_FINDINGS: FULL
OD_FINDINGS: FULL

## 2024-06-07 ASSESSMENT — LID EXAM ASSESSMENTS: OD_ANGULAR_BLEPHARITIS: RUL 1+

## 2024-06-07 NOTE — ASSESSMENT
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   Thank you for this dermatologic surgery referral. We recommended that the patient follow up with His referring dermatologist for routine skin exams following surgery.  Reason MD Janette Physician, Dermatology & Dermatologic Surgery [facility]

## 2024-06-07 NOTE — ASSESSMENT
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   Thank you for this dermatologic surgery referral. We recommended that the patient follow up with His referring dermatologist for routine skin exams following surgery.  Reason MD Janette Physician, Dermatology & Dermatologic Surgery [facility]

## 2024-06-07 NOTE — HISTORY OF PRESENT ILLNESS
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 JESSE WHITESIDE is a 78 year old M who presents for  follow up s/p Mohs as above. Has been doing well since surgery without issues.   ROS: Patient denies fever, chills, night sweats, unintentional weight loss or other constitutional symptoms

## 2024-06-07 NOTE — HISTORY OF PRESENT ILLNESS
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 JESSE WHITESIDE is a 78 year old M who presents for  follow up s/p Mohs as above. Has been doing well since surgery without issues.   ROS: Patient denies fever, chills, night sweats, unintentional weight loss or other constitutional symptoms

## 2024-06-10 ENCOUNTER — APPOINTMENT (OUTPATIENT)
Dept: DERMATOLOGY | Facility: CLINIC | Age: 79
End: 2024-06-10

## 2024-08-02 ENCOUNTER — APPOINTMENT (OUTPATIENT)
Dept: DERMATOLOGY | Facility: CLINIC | Age: 79
End: 2024-08-02
Payer: MEDICARE

## 2024-08-02 PROCEDURE — 17000 DESTRUCT PREMALG LESION: CPT

## 2024-08-02 PROCEDURE — 99213 OFFICE O/P EST LOW 20 MIN: CPT | Mod: 25

## 2024-08-02 PROCEDURE — 17003 DESTRUCT PREMALG LES 2-14: CPT

## 2024-11-08 ENCOUNTER — OFFICE (OUTPATIENT)
Dept: URBAN - METROPOLITAN AREA CLINIC 94 | Facility: CLINIC | Age: 79
Setting detail: OPHTHALMOLOGY
End: 2024-11-08
Payer: MEDICARE

## 2024-11-08 DIAGNOSIS — H40.1131: ICD-10-CM

## 2024-11-08 PROCEDURE — 92250 FUNDUS PHOTOGRAPHY W/I&R: CPT | Performed by: OPHTHALMOLOGY

## 2024-11-08 PROCEDURE — 92012 INTRM OPH EXAM EST PATIENT: CPT | Performed by: OPHTHALMOLOGY

## 2024-11-08 PROCEDURE — 92083 EXTENDED VISUAL FIELD XM: CPT | Performed by: OPHTHALMOLOGY

## 2024-11-08 ASSESSMENT — KERATOMETRY
OS_K1POWER_DIOPTERS: 42.25
OD_K1POWER_DIOPTERS: 42.25
OD_AXISANGLE_DEGREES: 010
OD_K2POWER_DIOPTERS: 42.50
OS_K2POWER_DIOPTERS: 42.25
OS_AXISANGLE_DEGREES: 090

## 2024-11-08 ASSESSMENT — REFRACTION_MANIFEST
OD_CYLINDER: -1.50
OS_CYLINDER: -0.50
OD_AXIS: 095
OD_VA1: 20/50
OD_ADD: +2.50
OS_SPHERE: PLANO
OS_AXIS: 105
OD_SPHERE: -0.75
OS_ADD: +2.50
OS_VA1: 20/25
OU_VA: 20/25-1

## 2024-11-08 ASSESSMENT — TONOMETRY
OD_IOP_MMHG: 13
OS_IOP_MMHG: 13

## 2024-11-08 ASSESSMENT — VISUAL ACUITY
OD_BCVA: 20/25
OS_BCVA: 20/20-1

## 2024-11-08 ASSESSMENT — LACRIMAL DUCT - ASSESSMENT
OS_LACRIMAL_DUCT: DEXTENZA
OD_LACRIMAL_DUCT: DEXTENZA

## 2024-11-08 ASSESSMENT — REFRACTION_AUTOREFRACTION
OS_AXIS: 072
OS_CYLINDER: -0.75
OD_SPHERE: +0.50
OD_AXIS: 092
OS_SPHERE: +1.25
OD_CYLINDER: -1.00

## 2024-11-08 ASSESSMENT — REFRACTION_CURRENTRX
OD_OVR_VA: 20/
OD_SPHERE: +2.00
OS_SPHERE: +2.00
OS_OVR_VA: 20/

## 2024-11-08 ASSESSMENT — CONFRONTATIONAL VISUAL FIELD TEST (CVF)
OD_FINDINGS: FULL
OS_FINDINGS: FULL

## 2024-11-08 ASSESSMENT — LID EXAM ASSESSMENTS: OD_ANGULAR_BLEPHARITIS: RUL 1+

## 2025-01-09 NOTE — ED ADULT TRIAGE NOTE - MODE OF ARRIVAL
For Your Information     If your provider ordered any imaging for you today. Our pre-scheduling services will be reaching out to you within 2 business days to schedule this. Presched[facility] can be reached by calling [phone].    If you are in need of a medication refill, please use one of the following options. You can expect your medication to be filled within 2-3 business days.   1. Call your pharmacy for all medication refills and renewals.   2. [email or web address]- [email or web address]  3. Call your providers office    If your provider ordered testing today, you will be notified of your lab results within 3-5 business days and imaging results within 7-14 business days, unless specified otherwise. If you have not received your results within the allotted business days please call your provider's office. Have you signed up for the [employer] Hiro, if not please consider doing so to receive results on the hiro as soon as they have been resulted by the system. [email or web address]     Medication Prior Authorizations may take up to 30 days for approval/denial.     You may be receiving a survey.  Please take the time to complete this, as your feedback is very important to us!  We strive to make your experience exceptional, and your comments help us with that goal.  We look forward to hearing from you!    For all future appointments please arrive 15 minutes prior to your scheduled visit.     Patient Contact Center Business Office: assistance with medical billing & financial inquires [phone]                  Did you receive exceptional care from a Medical Assistant?    Want to let them know they made a difference?    Please share with us by accessing the online nomination form:               Or ask the  for a paper nomination form.                                                                                                                                                 Walk in

## 2025-02-04 ENCOUNTER — RESULT REVIEW (OUTPATIENT)
Age: 80
End: 2025-02-04

## 2025-02-04 ENCOUNTER — APPOINTMENT (OUTPATIENT)
Dept: DERMATOLOGY | Facility: CLINIC | Age: 80
End: 2025-02-04
Payer: MEDICARE

## 2025-02-04 PROCEDURE — 17003 DESTRUCT PREMALG LES 2-14: CPT

## 2025-02-04 PROCEDURE — 99213 OFFICE O/P EST LOW 20 MIN: CPT | Mod: 25

## 2025-02-04 PROCEDURE — 17000 DESTRUCT PREMALG LESION: CPT

## 2025-02-04 PROCEDURE — 69100 BIOPSY OF EXTERNAL EAR: CPT | Mod: 59

## 2025-03-05 ENCOUNTER — APPOINTMENT (OUTPATIENT)
Dept: DERMATOLOGY | Facility: CLINIC | Age: 80
End: 2025-03-05
Payer: MEDICARE

## 2025-03-05 ENCOUNTER — NON-APPOINTMENT (OUTPATIENT)
Age: 80
End: 2025-03-05

## 2025-03-05 PROCEDURE — 17311 MOHS 1 STAGE H/N/HF/G: CPT

## 2025-03-05 RX ORDER — MUPIROCIN 20 MG/G
2 OINTMENT TOPICAL
Qty: 1 | Refills: 0 | Status: ACTIVE | COMMUNITY
Start: 2025-03-05 | End: 1900-01-01

## 2025-03-10 ENCOUNTER — APPOINTMENT (OUTPATIENT)
Dept: DERMATOLOGY | Facility: CLINIC | Age: 80
End: 2025-03-10
Payer: MEDICARE

## 2025-03-10 PROBLEM — C44.222 PRIMARY SQUAMOUS CELL CARCINOMA OF RIGHT EAR: Status: ACTIVE | Noted: 2025-03-04

## 2025-03-10 PROCEDURE — 99212 OFFICE O/P EST SF 10 MIN: CPT

## 2025-03-17 ENCOUNTER — APPOINTMENT (OUTPATIENT)
Dept: DERMATOLOGY | Facility: CLINIC | Age: 80
End: 2025-03-17
Payer: MEDICARE

## 2025-03-17 DIAGNOSIS — C44.222 SQUAMOUS CELL CARCINOMA OF SKIN OF RIGHT EAR AND EXTERNAL AURICULAR CANAL: ICD-10-CM

## 2025-03-17 PROCEDURE — 99212 OFFICE O/P EST SF 10 MIN: CPT

## 2025-03-31 ENCOUNTER — APPOINTMENT (OUTPATIENT)
Dept: DERMATOLOGY | Facility: CLINIC | Age: 80
End: 2025-03-31
Payer: MEDICARE

## 2025-03-31 DIAGNOSIS — C44.222 SQUAMOUS CELL CARCINOMA OF SKIN OF RIGHT EAR AND EXTERNAL AURICULAR CANAL: ICD-10-CM

## 2025-03-31 PROCEDURE — 99212 OFFICE O/P EST SF 10 MIN: CPT

## 2025-04-21 ENCOUNTER — APPOINTMENT (OUTPATIENT)
Dept: DERMATOLOGY | Facility: CLINIC | Age: 80
End: 2025-04-21

## 2025-06-03 ENCOUNTER — OFFICE (OUTPATIENT)
Dept: URBAN - METROPOLITAN AREA CLINIC 94 | Facility: CLINIC | Age: 80
Setting detail: OPHTHALMOLOGY
End: 2025-06-03
Payer: MEDICARE

## 2025-06-03 DIAGNOSIS — Z96.1: ICD-10-CM

## 2025-06-03 DIAGNOSIS — H40.1131: ICD-10-CM

## 2025-06-03 PROCEDURE — 92014 COMPRE OPH EXAM EST PT 1/>: CPT | Performed by: OPHTHALMOLOGY

## 2025-06-03 PROCEDURE — 92133 CPTRZD OPH DX IMG PST SGM ON: CPT | Performed by: OPHTHALMOLOGY

## 2025-06-03 PROCEDURE — 92020 GONIOSCOPY: CPT | Performed by: OPHTHALMOLOGY

## 2025-06-03 ASSESSMENT — REFRACTION_AUTOREFRACTION
OD_SPHERE: +0.75
OD_AXIS: 092
OD_CYLINDER: -1.25
OS_CYLINDER: -0.75
OS_AXIS: 061
OS_SPHERE: +1.25

## 2025-06-03 ASSESSMENT — REFRACTION_MANIFEST
OS_SPHERE: PLANO
OS_ADD: +2.50
OD_AXIS: 095
OD_SPHERE: -0.75
OD_VA1: 20/50
OU_VA: 20/25-1
OS_VA1: 20/25
OS_AXIS: 105
OD_CYLINDER: -1.50
OD_ADD: +2.50
OS_CYLINDER: -0.50

## 2025-06-03 ASSESSMENT — REFRACTION_CURRENTRX
OD_OVR_VA: 20/
OD_SPHERE: +2.00
OS_SPHERE: +2.00
OS_OVR_VA: 20/

## 2025-06-03 ASSESSMENT — KERATOMETRY
OD_AXISANGLE_DEGREES: 019
OD_K2POWER_DIOPTERS: 42.50
OS_AXISANGLE_DEGREES: 090
OS_K2POWER_DIOPTERS: 42.50
OD_K1POWER_DIOPTERS: 42.25
OS_K1POWER_DIOPTERS: 42.50

## 2025-06-03 ASSESSMENT — TONOMETRY
OS_IOP_MMHG: 13
OD_IOP_MMHG: 13

## 2025-06-03 ASSESSMENT — VISUAL ACUITY
OD_BCVA: 20/25-2
OS_BCVA: 20/25

## 2025-06-03 ASSESSMENT — LID EXAM ASSESSMENTS: OD_ANGULAR_BLEPHARITIS: RUL 1+

## 2025-06-03 ASSESSMENT — LACRIMAL DUCT - ASSESSMENT
OS_LACRIMAL_DUCT: DEXTENZA
OD_LACRIMAL_DUCT: DEXTENZA

## 2025-06-03 ASSESSMENT — CONFRONTATIONAL VISUAL FIELD TEST (CVF)
OD_FINDINGS: FULL
OS_FINDINGS: FULL

## 2025-08-04 ENCOUNTER — APPOINTMENT (OUTPATIENT)
Dept: DERMATOLOGY | Facility: CLINIC | Age: 80
End: 2025-08-04

## 2025-08-07 ENCOUNTER — APPOINTMENT (OUTPATIENT)
Dept: DERMATOLOGY | Facility: CLINIC | Age: 80
End: 2025-08-07
Payer: MEDICARE

## 2025-08-07 PROCEDURE — 17004 DESTROY PREMAL LESIONS 15/>: CPT

## 2025-08-07 PROCEDURE — 17110 DESTRUCTION B9 LES UP TO 14: CPT | Mod: 59

## 2025-08-07 PROCEDURE — 99214 OFFICE O/P EST MOD 30 MIN: CPT | Mod: 25
